# Patient Record
Sex: FEMALE | Employment: FULL TIME | URBAN - METROPOLITAN AREA
[De-identification: names, ages, dates, MRNs, and addresses within clinical notes are randomized per-mention and may not be internally consistent; named-entity substitution may affect disease eponyms.]

---

## 2019-04-16 PROBLEM — N92.0 MENORRHAGIA: Status: ACTIVE | Noted: 2019-04-16

## 2019-12-26 ENCOUNTER — OFFICE VISIT (OUTPATIENT)
Dept: SURGERY | Age: 39
End: 2019-12-26

## 2019-12-26 VITALS
DIASTOLIC BLOOD PRESSURE: 76 MMHG | HEIGHT: 64 IN | OXYGEN SATURATION: 98 % | TEMPERATURE: 98.5 F | RESPIRATION RATE: 18 BRPM | HEART RATE: 80 BPM | WEIGHT: 289.6 LBS | SYSTOLIC BLOOD PRESSURE: 134 MMHG | BODY MASS INDEX: 49.44 KG/M2

## 2019-12-26 DIAGNOSIS — N39.3 SUI (STRESS URINARY INCONTINENCE, FEMALE): ICD-10-CM

## 2019-12-26 DIAGNOSIS — G47.33 OSA (OBSTRUCTIVE SLEEP APNEA): ICD-10-CM

## 2019-12-26 DIAGNOSIS — E66.01 MORBID OBESITY (HCC): ICD-10-CM

## 2019-12-26 DIAGNOSIS — E66.01 MORBID OBESITY (HCC): Primary | ICD-10-CM

## 2019-12-26 RX ORDER — SERTRALINE HYDROCHLORIDE 100 MG/1
150 TABLET, FILM COATED ORAL
COMMUNITY
Start: 2019-05-16

## 2019-12-26 RX ORDER — DOXYCYCLINE 100 MG/1
CAPSULE ORAL
COMMUNITY
Start: 2019-11-03 | End: 2020-03-19 | Stop reason: ALTCHOICE

## 2019-12-26 RX ORDER — TRAVOPROST 0.04 MG/ML
SOLUTION/ DROPS OPHTHALMIC
COMMUNITY
Start: 2019-11-05

## 2019-12-26 NOTE — LETTER
12/26/19 Patient: Rico Hurtado  
YOB: 1980 Date of Visit: 12/26/2019 GRETA Cummins/ Yue Myles [address] [place] [phone] [place] [address] VIA Facsimile: [phone] Dear Lenin Lux DO, Thank you for referring Ms. Rico Hurtado to [facility] for evaluation. My notes for this consultation are attached. If you have questions, please do not hesitate to call me. I look forward to following your patient along with you.  
 
 
Sincerely, 
 
Anthony Castaneda MD

## 2019-12-26 NOTE — PROGRESS NOTES
Initial Consultation for Bariatric Surgery Template (Gastric Bypass)    Shaniqua Souza is a 44 y.o. female who comes into the office today for initial consultation for the surgical options for the treatment of morbid obesity. The patient initially identified obesity at the age of 23 and at age 25 weighed 135 lbs. She has tried a variety of unsupervised weight-loss attempts including self imposed, Weight Watchers, registered dietician and keto, but has yet to meet with lasting success. Maximum weight lost on a diet is about 30 lbs, but that the weight loss always seems to return. She saw me 3.5 ears ago for possible WLS but lost her insurance. Today, the patient is  Height: 5' 4\" (162.6 cm) tall, Weight: 131.4 kg (289 lb 9.6 oz) lbs for a Body mass index is 49.71 kg/m². It is due to the patient's severe obesity, which is further complicated by stress urinary incontinence  that the patient is now seeking out bariatric surgery, specifically, gastric sleeve. Past Medical History:   Diagnosis Date    Acne     Anxiety     Depression     Endometrial polyp     History of heavy vaginal bleeding     Iron deficiency anemia     had iron infusions every other week x4. Last one was 8/4/16    Obesity     Ocular hypertension 2016    Ovarian cyst     Overactive bladder        Past Surgical History:   Procedure Laterality Date    HX DILATION AND CURETTAGE  04/02/2015    D&C; REMOVAL OF ENDOMETRIAL POLYP     HX HYSTEROSCOPY WITH ENDOMETRIAL ABLATION  2014    HX PARTIAL HYSTERECTOMY  04/2019    HX TUBAL LIGATION  2010    tubal ligation        Current Outpatient Medications on File Prior to Visit   Medication Sig Dispense Refill    sertraline (ZOLOFT) 100 mg tablet Take 150 mg by mouth.  TRAVATAN Z 0.004 % ophthalmic solution INT 1 GTT IN OU HS      doxycycline (VIBRAMYCIN) 100 mg capsule TK 1 C PO ONCE DAILY      traZODone (DESYREL) 50 mg tablet Take 25-50 mg by mouth.       oxybutynin chloride XL (DITROPAN XL) 5 mg CR tablet Take 10 mg by mouth daily.  sertraline (ZOLOFT) 100 mg tablet Take  by mouth daily. No current facility-administered medications on file prior to visit. No Known Allergies    Social History     Tobacco Use    Smoking status: Never Smoker    Smokeless tobacco: Never Used   Substance Use Topics    Alcohol use:  Yes     Alcohol/week: 2.0 standard drinks     Types: 2 Glasses of wine per week     Comment: 2 or 3 glasses wine or liquor per weej    Drug use: No       Family History   Problem Relation Age of Onset    Hypertension Mother     Other Mother         mental illness    No Known Problems Father     Blindness Sister     MS Sister     No Known Problems Brother     Heart Attack Maternal Grandmother     Cancer Maternal Grandfather         throat cancer     Cancer Paternal Grandmother         breast cancer, and lung cancer     Dementia Paternal Grandfather     No Known Problems Sister     No Known Problems Brother        Family Status   Relation Name Status    Mother  Alive    Father  Alive    Sister  Alive    Brother  Alive    MGM      MGF      PGM      PGF      Sister  Alive    Brother  Alive       Review of Systems:  Positive in [place]     CONST: Fever, weight loss, fatigue or chills  GI: Nausea, vomiting, abdominal pain, change in bowel habits, hematochezia, melena, and GERD   INTEG: Dermatitis, abnormal moles  HEENT: Recent changes in vision, vertigo, epistaxis, dysphagia and hoarseness  CV: Chest pain, palpitations, HTN, edema and varicosities  RESP: Cough, shortness of breath, wheezing, hemoptysis, snoring and reactive airway disease  : Hematuria, dysuria, frequency, urgency, nocturia and stress urinary incontinence   MS: Weakness, joint pain and arthritis  ENDO: Diabetes, thyroid disease, polyuria, polydipsia, polyphagia, poor wound healing, heat intolerance, cold intolerance  LYMPH/HEME: Anemia, bruising and history of blood transfusions  NEURO: Dizziness, headache, fainting, seizures and stroke  PSYCH: Anxiety and depression    Physical Exam    Visit Vitals  /76   Pulse 80   Temp 98.5 °F (36.9 °C) (Oral)   Resp 18   Ht 5' 4\" (1.626 m)   Wt 131.4 kg (289 lb 9.6 oz)   LMP 04/09/2019   SpO2 98%   BMI 49.71 kg/m²       Pre op weight: 289.6  EBW: 158. 6  Wt loss to date: 0       General: 44 y.o.) female in no acute distress. Morbidly obese in hips and buttocks - gynecoid pattern  HEENT: Normocephalic, atraumatic, Pupils equal and reactive, nasopharynx clear, oropharynx clear and moist without lesions  NECK: Supple, no lymphadenopathy, thyromegaly, carotid bruits or jugular venous distension. trachea midline  RESP: Clear to auscultation bilaterally, no wheezes, rhonchi, or rales, normal respiratory excursion  CV: Regular rate and rhythm, no murmurs, rubs or gallops. 3+/4 pulses in bilateral dorsalis pedis and posterior tibialis. No distal edema or varicosities. ABD: Soft, nontender, nondistended, normoactive bowel sounds, no hernias, no hepatosplenomegaly, easily palpable costal margins, gynecoid distribution, healed lap scars  Extremities: Warm, well perfused, no tenderness or swelling, normal gait/station  Neuro: Sensation and strength grossly intact and symmetrical  Psych: Alert and oriented to person, place, and time. Impression:    Lily Beckham is a 44 y.o. female who is suffering from morbid obesity with a BMI of 50  and comorbidities including stress urinary incontinence  who would benefit from bariatric surgery. We have had an extensive discussion with regard to the risks, benefits and likely outcomes of the operation. We've discussed the restrictive and malabsorptive nature of the gastric bypass and compared and contrasted with the sleeve gastrectomy. The patient understands the likelihood of losing approximately 80% of their excess weight in 12 to 18 months.  She also understands the risks including but not limited to bleeding, infection, need for reoperation, ulcers, leaks and strictures, bowel obstruction secondary to adhesions and internal hernias, DVT, PE, heart attack, stroke, and death. Patient also understands risks of inadequate weight loss, excess weight loss, vitamin insufficiency, protein malnutrition, excess skin, and loss of hair. We have reviewed the components of a successful postoperative course including requirement for a high protein, low carbohydrate diet, 60 oz a day of zero calorie liquids, daily vitamin supplementation, daily exercise, regular follow-up, and participation in support groups. At this time we will enroll the patient in our bariatric program, undertake routine laboratory evaluation, chest X-ray, EKG, possible UGI and evaluation by  nutritionist as well as psychologist and pending their satisfactory completion of the preop evaluation, plan to perform a gastric bypass.

## 2019-12-26 NOTE — PROGRESS NOTES
Chief Complaint   Patient presents with    Morbid Obesity     consult     Pt ID confirmed    Weight Loss Metrics 12/26/2019 12/26/2019 4/16/2019 4/3/2019 12/6/2016 9/13/2016 8/19/2016   Pre op / Initial Wt 289.6 - - - - - -   Today's Wt - 289 lb 9.6 oz 270 lb 1 oz 275 lb 284 lb 287 lb 14.7 oz 290 lb   BMI - 49.71 kg/m2 44.94 kg/m2 45.76 kg/m2 47.26 kg/m2 47.91 kg/m2 48.26 kg/m2   Ideal Body Wt 131 - - - - - -   Excess Body Wt 158.6 - - - - - -   Wt loss to date 0 - - - - - -   % Wt Loss 0 - - - - - -   80% .88 - - - - - -       Body mass index is 49.71 kg/m².

## 2020-01-07 ENCOUNTER — HOSPITAL ENCOUNTER (OUTPATIENT)
Dept: BARIATRICS/WEIGHT MGMT | Age: 40
Discharge: HOME | End: 2020-01-07

## 2020-01-08 ENCOUNTER — DOCUMENTATION ONLY (OUTPATIENT)
Dept: BARIATRICS/WEIGHT MGMT | Age: 40
End: 2020-01-08

## 2020-01-08 NOTE — PROGRESS NOTES
[address] Loss Taiwo Gant [address] [facility], [address]    Patient's Name: Becca Lopez   Age: 36 y.o. YOB: 1980   Sex: female    Date:   1/8/2020        Session: 1 of 6  Revision:     Surgeon:  Dr. Homero Avila    Height: 5 f 4   Weight:    285      Lbs. BMI: 49.0   Pounds Lost since last month: 5                 Pounds Gained since last month: 0    Starting Weight: 290     Previous Months Weight: 290  Overall Pounds Lost: 5   Overall Pounds Gained: 0    Do you smoke? None    Alcohol intake:  Number of drinks at a time:  1  Number of times a week: 3    Class Guidelines    Guidelines are reviewed with patient at the start of every class. 1. Patient understands that weight loss trial classes must be consecutive. Patient understands if they miss a class, it is their responsibility to contact me to reschedule class. I will reach out to patient after their first no show. 2.  Patient understands the expectations that weight maintenance/weight loss is expected during the classes. Failure to demonstrate changes may result in one extra month of weight loss trial, followed by going back to see the surgeon. 3. Patient is also instructed to be doing their labs, blood work, psych visit, support group and any other test that the surgeon has used while they are working on their weight loss trial.    Other Pertinent Information:     Changes Made Since Last Class: Less bread and carbohydrates. Eating Habits and Behaviors      Today we reviewed key diet principles. We talked about snack ideas that would focus more on protein. We also talked about the benefits of filling up on protein first and keeping the daily carbohydrate intake to less than 100 grams per day. Patient was instructed to increase fluid intake to 64 ounces per day and stop all carbonation, caffeine, and sugary drinks.   During class, we talked about the importance of getting on a routine of eating 3 meals a day, eating within one hour of waking up, and not going longer than 4 hours without fueling the body again. I also talked with patient about some meal ideas. Patient's current diet habits include: 3 meals per day. Patient is eating sausage and eggs for breakfast.  Lunch is salad or sandwich. Dinner is seafood, mac and cheese, beef and cabbage. She is snacking on pepperoni, almonds, cheese sticks. She is drinking 60-75 ounces of fluid per day. Physical Activity/Exercise    Comments:     Currently for exercise, patient is not doing anything stating she doesn't have time. We talked about activities for patient to do, including walking, swimming, or chair exercises. Behavior Modification       Comments:   During class, I reviewed a power point with patients called, \"Assessing Your Readiness to Change. \"  During this power point, patient was asked to self-evaluate themselves. At the end, we tallied the scores to determine how ready they are to make changes for the surgery. For the New Year's, I had patient set New Year's resolutions, including a food-related goal, exercise-related goal, and behavior goal.  Patient was encouraged to track the goals on a daily basis using the check off list I provided. Goals should be SMART, specific, measurable, attainable, realistic, and time-orientated. Patient's Goals are:  1. Behavior-Related Goal: No eating after 7 pm.   2. Food-related goal: No bread.   3. Exercise-related goal: 2 x a week for 30-45 minutes of activity      Milka Holguin 87 RD  1/8/2020

## 2020-02-04 ENCOUNTER — HOSPITAL ENCOUNTER (OUTPATIENT)
Dept: BARIATRICS/WEIGHT MGMT | Age: 40
Discharge: HOME | End: 2020-02-04

## 2020-02-05 ENCOUNTER — DOCUMENTATION ONLY (OUTPATIENT)
Dept: BARIATRICS/WEIGHT MGMT | Age: 40
End: 2020-02-05

## 2020-02-05 NOTE — PROGRESS NOTES
[address] [place] BALDOMERO Stalin [address] [facility], [address]    Patient's Name: Adams Michaels   Age: 36 y.o. YOB: 1980   Sex: female    Date:   2/5/2020              Session: 2 of 6  Revision:     Surgeon:  Dr. Florentin Bravo    Height: 5 f 4   Weight:    283      Lbs. BMI: 48.6   Pounds Lost since last month: 2                 Pounds Gained since last month: 0    Starting Weight: 290     Previous Months Weight: 285  Overall Pounds Lost: 7   Overall Pounds Gained: 0    Do you smoke? NOne    Alcohol intake:  Number of drinks at a time:  1  Number of times a week: 2-5 x a week    Class Guidelines    Guidelines are reviewed with patient at the start of every class. 1. Patient understands that weight loss trial classes must be consecutive. Patient understands if they miss a class, it is their responsibility to contact me to reschedule class. I will reach out to patient after their first no show. 2.  Patient understands the expectations that weight maintenance/weight loss is expected during the classes. Failure to demonstrate changes may result in one extra month of weight loss trial, followed by going back to see the surgeon. 3. Patient is also instructed to be doing their labs, blood work, psych visit, support group and any other test that the surgeon has used while they are working on their weight loss trial.   Patient understands that they CAN NOT gain any weight during the weight loss trial.  Gaining weight will result in extra classes    Other Pertinent Information:     Changes Made Since Last Class: Less carbohydrates. Purchase fitbit    Eating Habits and Behaviors      Today we started off class talking about the Key Diet Principles. Patient was encouraged to start drinking 64 ounces of fluid per day. Patient was encouraged to start cutting out soda, caffeine, carbonation, sweet tea, fruit juice, and fruit smoothies.   Patient is currently drinking 70 ounces of fluid, which consists of 50 ounces of water. Patient was also instructed to fill up on meat, fish, vegetables, eggs, cheese, and some fruit. We also talked about protein drinks and patient was encouraged to start trying these, using them either for a meal replacement or a substitute for a current meal, which may be higher in carbohydrates. We talked about ways to lower carbohydrates and start trying substitutes, such as zucchini noodles and cauliflower rice. Patient's current diet habits include: 3 meals per day. She is eating eggs and meats. Lunch is tuna or meatballs. Dinner is chicken, meatballs, salad. She is snacking on cheese, almonds, pecans, or protein drinks. She is drinking 70 ounces of fluid, including 50 ounces of water and 20 ounces of Crystal Light. Physical Activity/Exercise    Comments:     Currently for exercise, patient is not doing anything for activity. We talked about activities for patient to do, including walking, swimming, or chair exercises. I also talked with patient about doing some strength training, which helps the metabolism, as well. Goals have been set. Behavior Modification       Comments:   I also gave a power point on Behavior Changes and Weight Loss. Some of the suggestions in the power point included food journaling. Patient was also given some strategies to follow, such as cooking just enough for the meal and not putting serving bowls on the table. Patient was also encouraged to restrict where they are eating to 1-2 locations to avoid mindless eating throughout the day. Patient was given a check off list and encouraged to set 3 goals for the month. One goal that patient has set for next month is:  1. Go to [employer] once a week. [id number] once a week.       Jazmín Vargas MS RD  2/5/2020

## 2020-03-03 ENCOUNTER — HOSPITAL ENCOUNTER (OUTPATIENT)
Dept: BARIATRICS/WEIGHT MGMT | Age: 40
Discharge: HOME | End: 2020-03-03

## 2020-03-03 ENCOUNTER — DOCUMENTATION ONLY (OUTPATIENT)
Dept: BARIATRICS/WEIGHT MGMT | Age: 40
End: 2020-03-03

## 2020-03-19 ENCOUNTER — OFFICE VISIT (OUTPATIENT)
Dept: SURGERY | Age: 40
End: 2020-03-19

## 2020-03-19 VITALS
WEIGHT: 284 LBS | HEART RATE: 82 BPM | RESPIRATION RATE: 16 BRPM | DIASTOLIC BLOOD PRESSURE: 84 MMHG | OXYGEN SATURATION: 98 % | BODY MASS INDEX: 48.49 KG/M2 | SYSTOLIC BLOOD PRESSURE: 140 MMHG | HEIGHT: 64 IN | TEMPERATURE: 97.5 F

## 2020-03-19 DIAGNOSIS — E66.01 MORBID OBESITY (HCC): Primary | ICD-10-CM

## 2020-03-19 DIAGNOSIS — N39.3 SUI (STRESS URINARY INCONTINENCE, FEMALE): ICD-10-CM

## 2020-03-19 DIAGNOSIS — G47.33 OSA (OBSTRUCTIVE SLEEP APNEA): ICD-10-CM

## 2020-03-19 RX ORDER — FAMOTIDINE 20 MG/1
20 TABLET, FILM COATED ORAL 2 TIMES DAILY
COMMUNITY
End: 2020-12-02 | Stop reason: ALTCHOICE

## 2020-03-19 NOTE — PROGRESS NOTES
Bariatric Preoperative Progress Note    Subjective:     Dax Ambriz is a 36 y.o. female who presents today for followup of their candidacy for bariatric surgery. Since last seen, Dax Ambriz has been working through bariatric program towards Laparoscopic Gastric Bypass. Past Medical History:   Diagnosis Date    Acne     Anxiety     Depression     Endometrial polyp     History of heavy vaginal bleeding     Iron deficiency anemia     had iron infusions every other week x4. Last one was 8/4/16    Obesity     Ocular hypertension 2016    Ovarian cyst     Overactive bladder        Past Surgical History:   Procedure Laterality Date    HX DILATION AND CURETTAGE  04/02/2015    D&C; REMOVAL OF ENDOMETRIAL POLYP     HX HYSTEROSCOPY WITH ENDOMETRIAL ABLATION  2014    HX PARTIAL HYSTERECTOMY  04/2019    HX TUBAL LIGATION  2010    tubal ligation        Current Outpatient Medications   Medication Sig Dispense Refill    famotidine (Pepcid) 20 mg tablet Take 20 mg by mouth two (2) times a day.  sertraline (ZOLOFT) 100 mg tablet Take 150 mg by mouth.  TRAVATAN Z 0.004 % ophthalmic solution INT 1 GTT IN OU HS      oxybutynin chloride XL (DITROPAN XL) 5 mg CR tablet Take 10 mg by mouth daily.  traZODone (DESYREL) 50 mg tablet Take 25-50 mg by mouth.          No Known Allergies    ROS:  Review of Systems:  Positives in [place]  CONST: Fever, weight loss, fatigue-improved or chills  GI: Nausea, vomiting, abdominal pain, change in bowel habits, hematochezia, melena, and GERD   INTEG: Dermatitis, abnormal moles  HEENT: Recent changes in vision, vertigo, epistaxis, dysphagia and hoarseness  CV: Chest pain, palpitations, HTN, edema and varicosities  RESP: Cough, shortness of breath, wheezing, hemoptysis, snoring and reactive airway disease  : Hematuria, dysuria, frequency, urgency, nocturia and stress urinary incontinence   MS: Weakness, joint pain and arthritis  ENDO: Diabetes, thyroid disease, polyuria, polydipsia, polyphagia, poor wound healing, heat intolerance, cold intolerance  LYMPH/HEME: Anemia, bruising and history of blood transfusions  NEURO: Dizziness, headache, fainting, seizures and stroke  PSYCH: Anxiety and depression    Remainder of ROS as per HPI. Objective:     Physical Exam:  Visit Vitals  /84   Pulse 82   Temp 97.5 °F (36.4 °C) (Oral)   Resp 16   Ht 5' 4\" (1.626 m)   Wt 128.8 kg (284 lb)   LMP 04/09/2019   SpO2 98%   BMI 48.75 kg/m²         General: AAOX3, pleasant and cooperative to exam. Appropriately groomed. NAD. Non-toxic in appearance. Appears stated age. HENT: NC/AT. PERRLA. Extraocular motions are intact. Sclera anicteric, Conjunctiva Clear. Nares clear. Oropharynx pink, moist without exudate or erythema. Uvula Midline. Neck:  Supple, trachea is midline. No JVD, Lymphadenopathy. No bruits. Chest: Good equal bilateral expansion  Lungs: Clear to auscultation bilaterally without e/o crackles, wheezes or rhales. Heart: RRR, S1 and S2 noted. No c/r/m/g/vpmi. Abdomen: obese, soft and non-tender without distension. Good bowel sounds. No vis/palp masses or pulsations. No organo-splenomegaly. No hernias to my exam. No e/o acute abdomen or peritoneal signs. Pelvis: Stable. :  Deferred  Rectal: Deferred  Extremities: Positive pulses in all 4 extremities. Baseline range of motion in all 4 extremities. Strength, sensation and reflexes intact, appropriate and equal in b/l upper and lower extremities. No C/C/E  Neuro: CN II-XII grossly intact without focal deficit. Ambulatory. Skin: Clean, warm and dry. Studies to date:    Labs: Pending-not complete    EKG: Pending-not complete     Nutritional evaluation: 3 of 6 complete     Psychiatric evaluation:Approved     Support Group: Has not attended     Additional evaluations:None        Assessment:   Ritesh Grey is a 36 y.o. female who is progressing through the bariatric preoperative evaluation.   At this time, they are not yet an appropriate candidate for weight loss surgery. Ms. Licha Pagan has a reminder for a \"due or due soon\" health maintenance. I have asked that she contact her primary care provider for follow-up on this health maintenance. Plan:   -complete remainder of preop evaluation including nutrition visit, support group attendance, complete labs and ekg.   -Patient voices understanding that weight gain during weight loss trial may result in cancellation of weight loss surgery.   -Follow up once has completed entirety of weight loss workup to determine next steps.     -F/U in two months for MARIA T Mendoza

## 2020-03-21 ENCOUNTER — HOSPITAL ENCOUNTER (OUTPATIENT)
Dept: LAB | Age: 40
Discharge: HOME | End: 2020-03-21
Payer: MEDICAID

## 2020-03-21 ENCOUNTER — HOSPITAL ENCOUNTER (OUTPATIENT)
Dept: LAB | Age: 40
Discharge: HOME | End: 2020-03-21

## 2020-03-21 DIAGNOSIS — N39.3 SUI (STRESS URINARY INCONTINENCE, FEMALE): ICD-10-CM

## 2020-03-21 DIAGNOSIS — E66.01 MORBID OBESITY (HCC): ICD-10-CM

## 2020-03-21 DIAGNOSIS — G47.33 OSA (OBSTRUCTIVE SLEEP APNEA): ICD-10-CM

## 2020-03-21 LAB
25(OH)D3 SERPL-MCNC: 25.8 NG/ML (ref 32–100)
ALBUMIN SERPL-MCNC: 4.2 G/DL (ref 3.5–5)
ANION GAP SERPL CALC-SCNC: 12 MMOL/L
ANISOCYTOSIS: ABNORMAL
ATRIAL RATE: 86 BPM
AVG GLU, 10930: 123 MG/DL (ref 91–123)
BASOPHILS ABS-DIF,2030: 0.1 K/UL (ref 0–0.2)
BASOPHILS C MAN (DIFF), 1068: 1 % (ref 0–2)
BUN SERPL-MCNC: 8 MG/DL (ref 6–22)
CALCIUM SERPL-MCNC: 9.4 MG/DL (ref 8.4–10.5)
CALCULATED P AXIS, ECG09: 11 DEGREES
CALCULATED R AXIS, ECG10: -9 DEGREES
CALCULATED T AXIS, ECG11: -12 DEGREES
CHLORIDE SERPL-SCNC: 103 MMOL/L (ref 98–110)
CO2 SERPL-SCNC: 22 MMOL/L (ref 20–32)
CREAT SERPL-MCNC: 0.5 MG/DL (ref 0.5–1.2)
DIAGNOSIS, 93000: NORMAL
ERYTHROCYTE [DISTWIDTH] IN BLOOD BY AUTOMATED COUNT: 17.1 % (ref 10–15.5)
FOLATE,FOL: 8.64 NG/ML
GFRAA, 66117: >60
GFRNA, 66118: >60
GLUCOSE SERPL-MCNC: 95 MG/DL (ref 70–99)
HBA1C MFR BLD HPLC: 5.9 % (ref 4.8–5.6)
HCT VFR BLD AUTO: 41.4 % (ref 35.1–46.5)
HGB BLD-MCNC: 12.6 G/DL (ref 11.7–15.5)
HYPOCHROMIA, 12007: ABNORMAL
IRON,IRN: 59 MCG/DL (ref 30–160)
LYMPHOCYTES C MAN (DIFF), 1065: 53 % (ref 20–45)
LYMPHS ABS-DIF,2105: 4.3 K/UL (ref 1–4.8)
MCH RBC QN AUTO: 23 PG (ref 26–34)
MCHC RBC AUTO-ENTMCNC: 30 G/DL (ref 31–36)
MCV RBC AUTO: 76 FL (ref 81–99)
MICROCYTES, 12013: ABNORMAL
MONOCYTES ABS-DIF,2141: 0.5 K/UL (ref 0.1–1)
MONOCYTES C MAN (DIFF), 1066: 6 % (ref 3–12)
NEUTROPHILS ABS,2156: 3.3 K/UL (ref 1.8–7.7)
NEUTROPHILS C MAN (DIFF), 1064: 40 % (ref 40–75)
OVALOCYTES, 1119: ABNORMAL
P-R INTERVAL, ECG05: 136 MS
PLATELET # BLD AUTO: 381 K/UL (ref 140–440)
PMV BLD AUTO: 11.5 FL (ref 9–13)
POTASSIUM SERPL-SCNC: 4.6 MMOL/L (ref 3.5–5.5)
Q-T INTERVAL, ECG07: 370 MS
QRS DURATION, ECG06: 86 MS
QTC CALCULATION (BEZET), ECG08: 442 MS
RBC # BLD AUTO: 5.42 M/UL (ref 3.8–5.2)
SENTARA SPECIMEN COL,SENBCF: NORMAL
SMEAR EVAL, 1131: ABNORMAL
SODIUM SERPL-SCNC: 137 MMOL/L (ref 133–145)
TSH SERPL DL<=0.005 MIU/L-ACNC: 0.9 MCU/ML (ref 0.27–4.2)
VENTRICULAR RATE, ECG03: 86 BPM
VIT B12 SERPL-MCNC: 401 PG/ML (ref 211–911)
WBC # BLD AUTO: 8.2 K/UL (ref 4–11)

## 2020-03-21 PROCEDURE — 93005 ELECTROCARDIOGRAM TRACING: CPT

## 2020-03-21 PROCEDURE — 99001 SPECIMEN HANDLING PT-LAB: CPT

## 2020-03-24 LAB — VITAMIN B1, WHOLE BLOOD, 66250: 96.6 NMOL/L (ref 66.5–200)

## 2020-03-25 LAB
H PYLORI IGA SER IA-ACNC: 9.3 UNITS (ref 0–8.9)
H PYLORI IGM SER-ACNC: <9 UNITS (ref 0–8.9)

## 2020-03-31 ENCOUNTER — TELEPHONE (OUTPATIENT)
Dept: SURGERY | Age: 40
End: 2020-03-31

## 2020-03-31 DIAGNOSIS — E55.9 VITAMIN D DEFICIENCY: ICD-10-CM

## 2020-03-31 DIAGNOSIS — A04.8 POSITIVE H. PYLORI TEST: Primary | ICD-10-CM

## 2020-03-31 RX ORDER — CLARITHROMYCIN 500 MG/1
500 TABLET, FILM COATED ORAL 2 TIMES DAILY
Qty: 28 TAB | Refills: 0 | Status: CANCELLED | OUTPATIENT
Start: 2020-03-31 | End: 2020-04-14

## 2020-03-31 RX ORDER — MELATONIN
1000 DAILY
Qty: 30 TAB | Refills: 2 | Status: CANCELLED | OUTPATIENT
Start: 2020-03-31

## 2020-03-31 RX ORDER — AMOXICILLIN 500 MG/1
500 CAPSULE ORAL 3 TIMES DAILY
Qty: 30 CAP | Refills: 0 | Status: CANCELLED | OUTPATIENT
Start: 2020-03-31 | End: 2020-04-10

## 2020-03-31 RX ORDER — OMEPRAZOLE 20 MG/1
20 CAPSULE, DELAYED RELEASE ORAL 2 TIMES DAILY
Qty: 28 CAP | Refills: 0 | Status: CANCELLED | OUTPATIENT
Start: 2020-03-31 | End: 2020-04-14

## 2020-03-31 NOTE — TELEPHONE ENCOUNTER
Spoke to patient to discuss lab values. Patient positive for H. Pylori. Will send treatment to [facility] on file and confirmed by patient. Patient also has a vitamin D. Deficiency, recommend vitamin D3 1,000 international units daily for treatment, medication prescribed to pharmacy.

## 2020-04-01 RX ORDER — OMEPRAZOLE 20 MG/1
20 CAPSULE, DELAYED RELEASE ORAL 2 TIMES DAILY
Qty: 28 CAP | Refills: 0 | Status: SHIPPED | OUTPATIENT
Start: 2020-04-01 | End: 2020-04-15

## 2020-04-01 RX ORDER — MELATONIN
1000 DAILY
Qty: 30 TAB | Refills: 2 | Status: SHIPPED | OUTPATIENT
Start: 2020-04-01 | End: 2020-12-02 | Stop reason: ALTCHOICE

## 2020-04-01 RX ORDER — AMOXICILLIN 500 MG/1
500 CAPSULE ORAL 3 TIMES DAILY
Qty: 30 CAP | Refills: 0 | Status: SHIPPED | OUTPATIENT
Start: 2020-04-01 | End: 2020-04-11

## 2020-04-01 RX ORDER — CLARITHROMYCIN 500 MG/1
500 TABLET, FILM COATED ORAL 2 TIMES DAILY
Qty: 28 TAB | Refills: 0 | Status: SHIPPED | OUTPATIENT
Start: 2020-04-01 | End: 2020-04-15

## 2020-04-07 ENCOUNTER — DOCUMENTATION ONLY (OUTPATIENT)
Dept: BARIATRICS/WEIGHT MGMT | Age: 40
End: 2020-04-07

## 2020-04-07 ENCOUNTER — HOSPITAL ENCOUNTER (OUTPATIENT)
Dept: BARIATRICS/WEIGHT MGMT | Age: 40
Discharge: HOME | End: 2020-04-07

## 2020-04-07 NOTE — PROGRESS NOTES
[address] Loss [place] [address] [address]    Patient's Name: Jenn Rodriguez   Age: 36 y.o. YOB: 1980   Sex: female    Date:   4/7/2020    Insurance:              Session: 4 of 6  Revision:     Surgeon:  Dr. Tl Bingham    Height: 5 f 4   Weight:    281      Lbs. BMI:    Pounds Lost since last month: 6                 Pounds Gained since last month: 0    Starting Weight: 290     Previous Months Weight: 287  Overall Pounds Lost: 9   Overall Pounds Gained: 0    Do you smoke? None    Alcohol intake:  Number of drinks at a time:  1  Number of times a week: 3 x a week    Class Guidelines    Guidelines are reviewed with patient at the start of every class. 1. Patient understands that weight loss trial classes must be consecutive. Patient understands if they miss a class, it is their responsibility to contact me to reschedule class. I will reach out to patient after their first no show. 2.  Patient understands the expectations that weight maintenance/weight loss is expected during the classes. Failure to demonstrate changes may result in one extra month of weight loss trial, followed by going back to see the surgeon. Patient understands that they CAN NOT gain any weight during the weight loss trial.  Gaining weight will result in extra classes. 3. Patient is also instructed to be doing their labs, blood work, psych visit, support group and any other test that the surgeon has used while they are working on their weight loss trial.  4.  Patient was instructed to bring their blue binder to every class and appointment. Other Pertinent Information:     Changes Made Since Last Class: None    Eating Habits and Behaviors      We started off class today by reviewing key diet principles. Patient was given a very specific list of foods that they can eat, which included meat, fish, vegetables, eggs, cheese, fats, soy, and berries. Patient was also given a list of foods that should be avoided. These included sweets (candy, soda, baked goods, ice cream), and starches, including pasta, rice, crackers, chips, oatmeal, bread. We talked about appropriate protein-based snacks, including deli meat, low fat cheese, yogurt, hummus, small handful of almonds. I also gave a power point on ways to help with the metabolism. Some of the food-related ways included: Healthy snacking, eating adequate protein, and getting adequate water. Mild dehydration may slow down one's weight loss. Patient's current diet habits include: Patient states she has been cutting back and has been eating more soups and stuff. She states she is eating 3 meals per day. Breakfast:  8:30-9:00 am:  She states she is eating scrambled eggs with cheese, sausage. Lunch:  She states she does do a protein shake sometimes for lunch. She states sometimes she will do fish or tilapia. She states she may do broccoli or cabbage or burgers without the bun. She states she is drinking 2.5 bottles of water per day. She states she is not really drinking liquid calories, but may have a soda occasionally when she is sick she may have ginger ale. Physical Activity/Exercise    Comments:     Currently for exercise, patient is doing squats. She states she hopes to be able to get out and walk more. We talked about activities for patient to do, including walking, swimming, or chair exercises. I also talked with patient about doing some strength training, which helps the metabolism, as well. Behavior Modification       Comments: In the power point, Mastering Your Metabolism, I also gave behaviors that can help with one's metabolism. These include not skipping meals and being sure to feed and fuel that body rather than going large gaps and putting the body in starvation mode. One goal for next month includes:  1.   Increase her walking       Milka Lazo RD  4/7/2020

## 2020-05-12 ENCOUNTER — HOSPITAL ENCOUNTER (OUTPATIENT)
Dept: BARIATRICS/WEIGHT MGMT | Age: 40
Discharge: HOME | End: 2020-05-12

## 2020-05-12 ENCOUNTER — DOCUMENTATION ONLY (OUTPATIENT)
Dept: BARIATRICS/WEIGHT MGMT | Age: 40
End: 2020-05-12

## 2020-05-12 NOTE — PROGRESS NOTES
[address] Rickie Loss Taiwo Gant [address] [facility], [address]    Patient's Name: Florencio Velez   Age: 36 y.o. YOB: 1980   Sex: female    Date:   5/12/2020    Insurance:              Session: 5 of  6  Surgeon:  Dr. Brina Clayton    Height: 5 f 4   Weight:    283      Lbs. BMI: 48.7   Pounds Lost since last month: 0                 Pounds Gained since last month: 2    Starting Weight: 290     Previous Months Weight: 281  Overall Pounds Lost: 7   Overall Pounds Gained: 0    Do you smoke? None    Alcohol intake:  Number of drinks at a time:  1  Number of times a week: 1    Class Guidelines    Guidelines are reviewed with patient at the start of every class. 1. Patient understands that weight loss trial classes must be consecutive. Patient understands if they miss a class, it is their responsibility to contact me to reschedule class. I will reach out to patient after their first no show. 2.  Patient understands the expectations that weight maintenance/weight loss is expected during the classes. Failure to demonstrate changes may result in one extra month of weight loss trial, followed by going back to see the surgeon. Patient understands that they CAN NOT gain any weight during the weight loss trial.  Gaining weight will result in extra classes. 3. Patient is also instructed to be doing their labs, blood work, psych visit, support group and any other test that the surgeon has used while they are working on their weight loss trial.  4.  Patient was instructed to bring their blue binder to every class and appointment. Other Pertinent Information:     Changes Made Since Last Class: She states she has been taking a detox drink and has started doing the protein shakes. Eating Habits and Behaviors      Today in class we talked about the key diet principles.   We start off each class talking about these principles, which include cutting out liquid calories and focusing on water or other non-calorie, non-carbonated drinks. We also spent time talking about carbohydrates, including foods that have carbohydrates and the goal to keep daily carbohydrates under 100 grams per day. Patient was given ideas of meal and snack choices that are lower in carbohydrates and focus more on protein. Patient was encouraged to start trying protein shakes and was given a list of suggestions. The main topic of class today was: Portion Control. We reviewed in class a power point filled with tips on ways to control portions, including using smaller plates, boxing up portions at a restaurant before starting to eat, and not eating from the container, but rather portioning snacks into smaller bags. Patient's were encouraged to food journal, which helps increase awareness of what and how much they are eating. It was emphasized to patient the importance of reading labels and portion sizes, but also applying these portion sizes. Patient was given a list of items that can help to make portion control easier. For example, a deck of cards or a palm of a hand is a proper portion of meat, a fist is a cup or a proper serving of vegetables. Patient was given 10 tips to help with the portion control. Patient's current diet habits include:  Patient is eating 3 meals per day. Patient states she is snacking on cocoa almonds. She states it is more at 50 ounces of water per day. She states she is not drinking any soda. She states she did have ginger ale when she was sick. She states that her carbohydrate intake has been improving. She states she is trying to get the grilled nuggets from [employer]. She states she is eating out 4 x a week. Physical Activity/Exercise    Comments:     Currently for exercise, patient is walking and doing squats. She states her goal is 30-45 minutes.   Patient was given a list of ideas for activity and was encouraged to incorporate 30 minutes a day into their daily routine. Behavior Modification       Comments: In class, we also focused on the behavior aspects of weight management. This includes being a mindful eater and not eating in front of the TV. Patient is also encouraged to take 20 minutes to eat a meal and eat at a table. One goal for next month includes:  1. Keep carbohydrates under 75 grams or less.       Milka Berger Crow [address] RD  5/12/2020

## 2020-05-14 ENCOUNTER — VIRTUAL VISIT (OUTPATIENT)
Dept: SURGERY | Age: 40
End: 2020-05-14

## 2020-05-14 VITALS — BODY MASS INDEX: 47.97 KG/M2 | HEIGHT: 64 IN | WEIGHT: 281 LBS

## 2020-05-14 DIAGNOSIS — G47.33 OSA (OBSTRUCTIVE SLEEP APNEA): ICD-10-CM

## 2020-05-14 DIAGNOSIS — E55.9 VITAMIN D DEFICIENCY: ICD-10-CM

## 2020-05-14 DIAGNOSIS — E66.01 MORBID OBESITY (HCC): Primary | ICD-10-CM

## 2020-05-14 NOTE — PROGRESS NOTES
Bariatric Preoperative Progress Note      Juan David Kellogg is a 36 y.o. female who was seen by synchronous (real-time) audio-video technology on 5/14/2020. Consent:  She and/or her healthcare decision maker is aware that this patient-initiated Telehealth encounter is a billable service, with coverage as determined by her insurance carrier. She is aware that she may receive a bill and has provided verbal consent to proceed: Yes    I was in the office while conducting this encounter. Location of patient: In car  Names and roles of persons participating in the telehealth services:  Start time:1340  End time: 1355        Subjective:     Juan David Kellogg is a 36 y.o. female who presents today for followup of their candidacy for bariatric surgery. Since last seen, Juan David Kellogg has been working through bariatric program towards LG. Past Medical History:   Diagnosis Date    Acne     Anxiety     Depression     Endometrial polyp     History of heavy vaginal bleeding     Iron deficiency anemia     had iron infusions every other week x4. Last one was 8/4/16    Obesity     Ocular hypertension 2016    Ovarian cyst     Overactive bladder        Past Surgical History:   Procedure Laterality Date    HX DILATION AND CURETTAGE  04/02/2015    D&C; REMOVAL OF ENDOMETRIAL POLYP     HX HYSTEROSCOPY WITH ENDOMETRIAL ABLATION  2014    HX PARTIAL HYSTERECTOMY  04/2019    HX TUBAL LIGATION  2010    tubal ligation        Current Outpatient Medications   Medication Sig Dispense Refill    cholecalciferol (VITAMIN D3) (1000 Units /25 mcg) tablet Take 1 Tab by mouth daily. 30 Tab 2    famotidine (Pepcid) 20 mg tablet Take 20 mg by mouth two (2) times a day.  sertraline (ZOLOFT) 100 mg tablet Take 150 mg by mouth.  TRAVATAN Z 0.004 % ophthalmic solution INT 1 GTT IN OU HS      traZODone (DESYREL) 50 mg tablet Take 25-50 mg by mouth.       oxybutynin chloride XL (DITROPAN XL) 5 mg CR tablet Take 10 mg by mouth daily. No Known Allergies    ROS:  Review of Systems:  Positives in [place]    Constitutional:  Unexpected weight gain/loss, night sweats,fatigue/maliase/lethargy, change in sleep, fever, rash  Eyes:  Visual changes, eye pain, floaters  ENT:  Rhinorrhea, epistaxis, sinus pain, change in hearing, gingival bleeding, sore throat, dysphagia, dysphonia  CV:  Chest pain, shortness of breath, difficulty breathing, orthopnea, palpitations, loss of consciousness, claudication  Resp: Cough, wheeze, haemoptysis, sob, exercise intolerence  GI:  Abdominal pain, dysphagia, reflux, bloating, cramping. Obstipation, haematemesis, brbpr, hematochezia,dark tarry stools. Nausea, vomitting, diarrhea, constipation. : Change in frequency of urination, dysuria, hematuria, change in force of Stream  Neuro: Paresthesias, numbness, weakness, changes in balance, changes in speech, loss of control of bowel or bladder, headaches. Psych:Changes in depression, anxiety, sleep patterns, change in energy Levels  Endocrine: Temperature intolerance, dry skin, hair loss, fatigue,  Episodes of hypoglycemia, changes in libido  Heme: Anemia, pupura, petechia  Skin: Rashes, itchiness, excessive bruising  Musculoskeletal: weakness, arthropathy, lower back pain    Remainder of ROS as per HPI.         Objective:     Vital Signs: (As obtained by patient/caregiver at home)  Visit Vitals   5' 4\" (1.626 m)   Wt 127.5 kg (281 lb)   LMP 04/09/2019   BMI 48.23 kg/m²        Constitutional: [x] Appears well-developed and well-nourished [x] No apparent distress      [] Abnormal -     Mental status: [x] Alert and awake  [x] Oriented to person/place/time [x] Able to follow commands    [] Abnormal -     Eyes:   EOM    [x]  Normal    [] Abnormal -   Sclera  [x]  Normal    [] Abnormal -          Discharge [x]  None visible   [] Abnormal -     HENT: [x] Normocephalic, atraumatic  [] Abnormal -   [x] Mouth/Throat: Mucous membranes are moist    External Ears [x] Normal  [] Abnormal -    Neck: [x] No visualized mass [] Abnormal -     Pulmonary/Chest: [x] Respiratory effort normal   [x] No visualized signs of difficulty breathing or respiratory distress        [] Abnormal -      Musculoskeletal:   [x] Normal gait with no signs of ataxia         [x] Normal range of motion of neck        [] Abnormal -     Neurological:        [x] No Facial Asymmetry (Cranial nerve 7 motor function) (limited exam due to video visit)          [x] No gaze palsy        [] Abnormal -          Skin:        [x] No significant exanthematous lesions or discoloration noted on facial skin         [] Abnormal -            Psychiatric:       [x] Normal Affect [] Abnormal -        [x] No Hallucinations    Other pertinent observable physical exam findings:-      Studies to date:    Labs: significant for H. Pylori (treated) and Vitamin D deficiency (ongoing tx)    EKG:  Normal sinus rhythm   Low voltage QRS   Nonspecific T wave abnormality   Abnormal ECG   No previous ECGs available   Confirmed by Aric Ceron MD, Karen Dang ([id number]) on 3/21/2020 1:09:43 PM     Nutritional evaluation: 5 of 6 complete     Psychiatric evaluation:  Approved     Support Group: Plan to complete next Thursday     Additional evaluations: None         Assessment:   Jossy Ac is a 36 y.o. female who is progressing through the bariatric preoperative evaluation. At this time, they are not yet an appropriate candidate for weight loss surgery. Ms. Clay Gutierrez has a reminder for a \"due or due soon\" health maintenance. I have asked that she contact her primary care provider for follow-up on this health maintenance. Plan:   -complete remainder of preop evaluation including 1 nutrition visit and support group attendance.   -Patient voices understanding that weight gain during weight loss trial may result in cancellation of weight loss surgery.   -Follow up once has completed entirety of weight loss workup to determine next steps. We discussed the expected course, resolution and complications of the diagnosis(es) in detail. Medication risks, benefits, costs, interactions, and alternatives were discussed as indicated. I advised her to contact the office if her condition worsens, changes or fails to improve as anticipated. She expressed understanding with the diagnosis(es) and plan. Pursuant to the emergency declaration under the [address] [place] [address] waiver authority and the [employer] Act, this Virtual  Visit was conducted, with patient's consent, to reduce the patient's risk of exposure to COVID-19 and provide continuity of care for an established patient. Services were provided through a video synchronous discussion virtually to substitute for in-person clinic visit.     Clovis Epley, NP

## 2020-05-14 NOTE — PROGRESS NOTES
Rebecca Liriano is a 36 y.o. female  Chief Complaint   Patient presents with    Morbid Obesity     midtrail     Pt ID confirmed    Weight Loss Metrics 5/14/2020 5/14/2020 3/19/2020 3/19/2020 12/26/2019 12/26/2019 4/16/2019   Pre op / Initial Wt 290 - 284 - 289.6 - -   Today's Wt - 281 lb - 284 lb - 289 lb 9.6 oz 270 lb 1 oz   BMI - 48.23 kg/m2 - 48.75 kg/m2 - 49.71 kg/m2 44.94 kg/m2   Ideal Body Wt 131 - 131 - 131 - -   Excess Body Wt 159 - 153 - 158.6 - -   Goal Wt 163 - 162 - - - -   Wt loss to date 9 - 0 - 0 - -   % Wt Loss 0.07 - 0 - 0 - -   80% .2 - 122.4 - 126.88 - -       Body mass index is 48.23 kg/m².

## 2020-06-09 ENCOUNTER — DOCUMENTATION ONLY (OUTPATIENT)
Dept: BARIATRICS/WEIGHT MGMT | Age: 40
End: 2020-06-09

## 2020-06-09 ENCOUNTER — HOSPITAL ENCOUNTER (OUTPATIENT)
Dept: BARIATRICS/WEIGHT MGMT | Age: 40
Discharge: HOME | End: 2020-06-09

## 2020-06-09 NOTE — PROGRESS NOTES
[address] Rickie Loss Taiwo Gant [address], [address]    Patient's Name: Sree Gleason   Age: 36 y.o. YOB: 1980   Sex: female    Date:   6/9/2020    Insurance:            Session: 6 of 6  Revision:   Surgeon:  Dr. Jostin Hodge    Height: 5 f 4 Weight:    278      Lbs. BMI: 47.8   Pounds Lost since last month: 5               Pounds Gained since last month: 0    Starting Weight: 290   Previous Months Weight: 283  Overall Pounds Lost: 12 Overall Pounds Gained: 0      Do you smoke? None    Alcohol intake:  Number of drinks at a time:  None  Number of times a week: None    Class Guidelines    Guidelines are reviewed with patient at the start of every class. 1. Patient understands that weight loss trial classes must be consecutive. Patient understands if they miss a class, it is their responsibility to contact me to reschedule class. I will reach out to patient after their first no show. 2.  Patient understands the expectations that weight maintenance/weight loss is expected during the classes. Failure to demonstrate changes may result in one extra month of weight loss trial, followed by going back to see the surgeon. Patient understands that they CAN NOT gain any weight during the weight loss trial.  Gaining weight will result in extra classes. 3. Patient is also instructed to be doing their labs, blood work, psych visit, support group and any other test that the surgeon has used while they are working on their weight loss trial.  4.  Patient was instructed to bring their blue binder to every class and appointment. Other Pertinent Information:     Changes Made Since Last Class: Patient has been doing more protein shakes and fluid. Eating Habits and Behaviors    Today in class, I reviewed a power point that discussed Nutrition, Behavior, and Exercise changes to start working on.   Some of the eating behaviors that we discussed included the importance of eating breakfast.  We talked about some of the reasons that people don't eat breakfast and food choices that would be appropriate. We also talked about avoiding liquid calories. Patient is encouraged to aim for 64 ounces of fluid per day and trying to get them from water, Crystal Light, sugar free drinks. We also talked about eating out options that are healthier. Patient was encouraged to always request sauces and dressings on the side and request a salad, broth-based soup, or vegetables in place of fries. Patient's current diet habits include: Patient states she is doing 3 meals and is using that as a snack. Patient states she is drinking more fluid. She is up to 100 ounces. She states she is using a protein shake between meals or cheese cubes or turkey pepperoni. She states she may have cocoa almonds. Physical Activity/Exercise    Comments: We talked about exercise. Patient was given reasons of why exercise is so important and how that can help with their long-term success. I have encouraged patient to get a support system to help with the activity. Currently for activity, patient is doing squats when she is going to the bathroom and trying to walk more. Behavior Modification       Comments: We also talked about behavior modifications. We talked about eating triggers, such as eating in front of the TV and solutions, such as making the TV a no eating zone. If patient is eating out out of emotion, food will only temporarily solve that. Patient is encouraged to HALT and assess if they are eating because they are Hungry, or out of emotions: Anxious, Lonely, Tired, which the food will only temporarily solve. We also talked about ways to prevent relapse. Goals that patient wants to work on includes:  1. Slow down when she eats  2.        Milka Mcmahon Crow [address] RD  6/9/2020

## 2020-07-06 ENCOUNTER — OFFICE VISIT (OUTPATIENT)
Dept: SURGERY | Age: 40
End: 2020-07-06

## 2020-07-06 VITALS
TEMPERATURE: 98.8 F | HEART RATE: 77 BPM | DIASTOLIC BLOOD PRESSURE: 76 MMHG | WEIGHT: 285.4 LBS | OXYGEN SATURATION: 98 % | RESPIRATION RATE: 16 BRPM | HEIGHT: 64 IN | BODY MASS INDEX: 48.73 KG/M2 | SYSTOLIC BLOOD PRESSURE: 128 MMHG

## 2020-07-06 DIAGNOSIS — N39.3 SUI (STRESS URINARY INCONTINENCE, FEMALE): ICD-10-CM

## 2020-07-06 DIAGNOSIS — E66.01 MORBID OBESITY (HCC): ICD-10-CM

## 2020-07-06 DIAGNOSIS — E66.01 MORBID OBESITY (HCC): Primary | ICD-10-CM

## 2020-07-06 DIAGNOSIS — G47.33 OSA (OBSTRUCTIVE SLEEP APNEA): ICD-10-CM

## 2020-07-06 NOTE — H&P (VIEW-ONLY)
Preop History and Physical Exam: 
 
Hernando Burris is a 36 y.o. female who comes into the office today after completing the entirety of the bariatric preoperative protocol satisfactorily. she initially identified obesity at the age of 23 and at age 25 weighed 135lbs. she has tried a variety of unsupervised weight-loss attempts, but has yet to meet with lasting success. Today, the patient is Height: 5' 4\" (162.6 cm) tall, Weight: 129.5 kg (285 lb 6.4 oz) lbs for a Body mass index is 48.99 kg/m². It is due to their severe obesity, which is further complicated by stress urinary incontinence  that the patient is now seeking out bariatric surgery, specifically, the gastric bypass Past Medical History:  
Diagnosis Date  Acne  Anxiety  Depression  Endometrial polyp  History of heavy vaginal bleeding  Iron deficiency anemia   
 had iron infusions every other week x4. Last one was 8/4/16  Obesity  Ocular hypertension 2016  Ovarian cyst   
 Overactive bladder Past Surgical History:  
Procedure Laterality Date  HX DILATION AND CURETTAGE  04/02/2015  
 D&C; REMOVAL OF ENDOMETRIAL POLYP   
 HX HYSTEROSCOPY WITH ENDOMETRIAL ABLATION  2014  HX PARTIAL HYSTERECTOMY  04/2019  HX TUBAL LIGATION  2010  
 tubal ligation Current Outpatient Medications on File Prior to Visit Medication Sig Dispense Refill  cholecalciferol (VITAMIN D3) (1000 Units /25 mcg) tablet Take 1 Tab by mouth daily. 30 Tab 2  
 famotidine (Pepcid) 20 mg tablet Take 20 mg by mouth two (2) times a day.  sertraline (ZOLOFT) 100 mg tablet Take 150 mg by mouth.  TRAVATAN Z 0.004 % ophthalmic solution INT 1 GTT IN OU HS    
 traZODone (DESYREL) 50 mg tablet Take 25-50 mg by mouth.  oxybutynin chloride XL (DITROPAN XL) 5 mg CR tablet Take 10 mg by mouth daily. No current facility-administered medications on file prior to visit. No Known Allergies Social History Tobacco Use  Smoking status: Never Smoker  Smokeless tobacco: Never Used Substance Use Topics  Alcohol use: Yes Alcohol/week: 2.0 standard drinks Types: 2 Glasses of wine per week Comment: 2 or 3 glasses wine or liquor per week  Drug use: No  
 
 
Family History Problem Relation Age of Onset  Hypertension Mother  Other Mother   
     mental illness  No Known Problems Father  Blindness Sister  MS Sister  No Known Problems Brother  Heart Attack Maternal Grandmother  Cancer Maternal Grandfather   
     throat cancer   Cancer Paternal Grandmother   
     breast cancer, and lung cancer   Dementia Paternal Grandfather  No Known Problems Sister  No Known Problems Brother Family Status Relation Name Status  Mother  Alive  Father  Alive  Sister  Alive  Brother  Alive  MGM    MGF    PGM    PGF    Sister  Alive  Brother  Alive Review of Systems:  Positive in [place] 
  
CONST: Fever, weight loss, fatigue or chills GI: Nausea, vomiting, abdominal pain, change in bowel habits, hematochezia, melena, and GERD INTEG: Dermatitis, abnormal moles HEENT: Recent changes in vision, vertigo, epistaxis, dysphagia and hoarseness CV: Chest pain, palpitations, HTN, edema and varicosities RESP: Cough, shortness of breath, wheezing, hemoptysis, snoring and reactive airway disease : Hematuria, dysuria, frequency, urgency, nocturia and stress urinary incontinence MS: Weakness, joint pain and arthritis ENDO: Diabetes, thyroid disease, polyuria, polydipsia, polyphagia, poor wound healing, heat intolerance, cold intolerance LYMPH/HEME: Anemia, bruising and history of blood transfusions NEURO: Dizziness, headache, fainting, seizures and stroke PSYCH: Anxiety and depression Physical Exam 
 
Visit Vitals /76 Pulse 77 Temp 98.8 °F (37.1 °C) (Oral) Resp 16  
 Ht 5' 4\" (1.626 m) Wt 129.5 kg (285 lb 6.4 oz) LMP 04/09/2019 SpO2 98% BMI 48.99 kg/m² General: 44 y.o.) female in no acute distress. Morbidly obese in hips and buttocks - gynecoid pattern HEENT: Normocephalic, atraumatic, Pupils equal and reactive, nasopharynx clear, oropharynx clear and moist without lesions NECK: Supple, no lymphadenopathy, thyromegaly, carotid bruits or jugular venous distension. trachea midline RESP: Clear to auscultation bilaterally, no wheezes, rhonchi, or rales, normal respiratory excursion CV: Regular rate and rhythm, no murmurs, rubs or gallops. 3+/4 pulses in bilateral dorsalis pedis and posterior tibialis. No distal edema or varicosities. ABD: Soft, nontender, nondistended, normoactive bowel sounds, no hernias, no hepatosplenomegaly, easily palpable costal margins, gynecoid distribution, healed lap and pfannenstiel scars Extremities: Warm, well perfused, no tenderness or swelling, normal gait/station Neuro: Sensation and strength grossly intact and symmetrical 
Psych: Alert and oriented to person, place, and time. Studies: LABS: within normal limits except for HP - treated; hypovit D 
EKG: without significant abnormalities NUTRITIONAL EVALUATION has deemed the patient a good candidate for weight loss surgery PSYCHIATRIC EVALUATION has deemed the patient a good candidate for weight loss surgery Impression: 
 
Rajiv Farias is a 36 y.o. female who is suffering from morbid obesity and comorbidities including stress urinary incontinencewho would benefit from bariatric surgery. We've discussed the restrictive and malabsorptive nature of the gastric bypass and compared and contrasted with the sleeve gastrectomy. The patient understands the likelihood of losing approximately 80% of their excess weight in 12 to 18 months.  She also understands the risks including but not limited to bleeding, infection, need for reoperation, anastomotic ulcers, leaks and strictures, bowel obstruction secondary to adhesions and internal hernias, DVT, PE, heart attack, stroke, and death. Patient also understands risks of inadequate weight loss, excess weight loss, vitamin insufficiency, protein malnutrition, excess skin, and loss of hair. We have reviewed the components of a successful postoperative course including requirement for a high protein, low carbohydrate diet, 60 oz a day of zero calorie liquids, daily vitamin supplementation, daily exercise, regular follow-up, and participation in support groups. We have reviewed the preoperative liver shrinking clear liquid diet, as well as reviewed any medication changes required while on the clear liquid diet. In addition, the patient understands that all medications to be taken during the first 8 weeks postoperatively can be taken whole as long as the medication is approximately the size of a Jd 325 mg aspirin tablet in size. The patient further understands that it is his/her responsibility to review these and verify with their primary care doctor and pharmacist that all medications are of the appropriate size. We will schedule the patient for laparoscopic gastric bypass in the near future. The patient was counseled at length about the risks of haley Covid-19 during their perioperative period and any recovery window from their procedure. The patient was made aware that haley Covid-19  may worsen their prognosis for recovering from their procedure and lend to a higher morbidity and/or mortality risk. All material risks, benefits, and reasonable alternatives including postponing the procedure were discussed. The patient does  wish to proceed with the procedure at this time.

## 2020-07-06 NOTE — PROGRESS NOTES
Rajiv Farias is a 36 y.o. female  Chief Complaint   Patient presents with    Pre-op Exam     patient presents today to discuss surgical options. Pt ID confirmed    Weight Loss Metrics 7/6/2020 7/6/2020 5/14/2020 5/14/2020 3/19/2020 3/19/2020 12/26/2019   Pre op / Initial Wt 285.4 - 290 - 284 - 289.6   Today's Wt - 285 lb 6.4 oz - 281 lb - 284 lb -   BMI - 48.99 kg/m2 - 48.23 kg/m2 - 48.75 kg/m2 -   Ideal Body Wt 131 - 131 - 131 - 131   Excess Body Wt 154.4 - 159 - 153 - 158.6   Goal Wt 162 - 163 - 162 - -   Wt loss to date 0 - 9 - 0 - 0   % Wt Loss 0 - 0.07 - 0 - 0   80% .52 - 127.2 - 122.4 - 126.88       Body mass index is 48.99 kg/m².

## 2020-07-06 NOTE — PROGRESS NOTES
Preop History and Physical Exam:    Anne Lopez is a 36 y.o. female who comes into the office today after completing the entirety of the bariatric preoperative protocol satisfactorily. she initially identified obesity at the age of 23 and at age 25 weighed 135lbs. she has tried a variety of unsupervised weight-loss attempts, but has yet to meet with lasting success. Today, the patient is Height: 5' 4\" (162.6 cm) tall, Weight: 129.5 kg (285 lb 6.4 oz) lbs for a Body mass index is 48.99 kg/m². It is due to their severe obesity, which is further complicated by stress urinary incontinence  that the patient is now seeking out bariatric surgery, specifically, the gastric bypass      Past Medical History:   Diagnosis Date    Acne     Anxiety     Depression     Endometrial polyp     History of heavy vaginal bleeding     Iron deficiency anemia     had iron infusions every other week x4. Last one was 8/4/16    Obesity     Ocular hypertension 2016    Ovarian cyst     Overactive bladder        Past Surgical History:   Procedure Laterality Date    HX DILATION AND CURETTAGE  04/02/2015    D&C; REMOVAL OF ENDOMETRIAL POLYP     HX HYSTEROSCOPY WITH ENDOMETRIAL ABLATION  2014    HX PARTIAL HYSTERECTOMY  04/2019    HX TUBAL LIGATION  2010    tubal ligation        Current Outpatient Medications on File Prior to Visit   Medication Sig Dispense Refill    cholecalciferol (VITAMIN D3) (1000 Units /25 mcg) tablet Take 1 Tab by mouth daily. 30 Tab 2    famotidine (Pepcid) 20 mg tablet Take 20 mg by mouth two (2) times a day.  sertraline (ZOLOFT) 100 mg tablet Take 150 mg by mouth.  TRAVATAN Z 0.004 % ophthalmic solution INT 1 GTT IN OU HS      traZODone (DESYREL) 50 mg tablet Take 25-50 mg by mouth.  oxybutynin chloride XL (DITROPAN XL) 5 mg CR tablet Take 10 mg by mouth daily. No current facility-administered medications on file prior to visit.         No Known Allergies    Social History Tobacco Use    Smoking status: Never Smoker    Smokeless tobacco: Never Used   Substance Use Topics    Alcohol use:  Yes     Alcohol/week: 2.0 standard drinks     Types: 2 Glasses of wine per week     Comment: 2 or 3 glasses wine or liquor per week    Drug use: No       Family History   Problem Relation Age of Onset    Hypertension Mother     Other Mother         mental illness    No Known Problems Father     Blindness Sister     MS Sister     No Known Problems Brother     Heart Attack Maternal Grandmother     Cancer Maternal Grandfather         throat cancer     Cancer Paternal Grandmother         breast cancer, and lung cancer     Dementia Paternal Grandfather     No Known Problems Sister     No Known Problems Brother        Family Status   Relation Name Status    Mother  Alive    Father  Alive    Sister  Alive    Brother  Alive    MGM      MGF      PGM      PGF      Sister  Alive    Brother  Alive       Review of Systems:  Positive in BOLD     CONST: Fever, weight loss, fatigue or chills  GI: Nausea, vomiting, abdominal pain, change in bowel habits, hematochezia, melena, and GERD   INTEG: Dermatitis, abnormal moles  HEENT: Recent changes in vision, vertigo, epistaxis, dysphagia and hoarseness  CV: Chest pain, palpitations, HTN, edema and varicosities  RESP: Cough, shortness of breath, wheezing, hemoptysis, snoring and reactive airway disease  : Hematuria, dysuria, frequency, urgency, nocturia and stress urinary incontinence   MS: Weakness, joint pain and arthritis  ENDO: Diabetes, thyroid disease, polyuria, polydipsia, polyphagia, poor wound healing, heat intolerance, cold intolerance  LYMPH/HEME: Anemia, bruising and history of blood transfusions  NEURO: Dizziness, headache, fainting, seizures and stroke  PSYCH: Anxiety and depression    Physical Exam    Visit Vitals  /76   Pulse 77   Temp 98.8 °F (37.1 °C) (Oral)   Resp 16   Ht 5' 4\" (1.626 m)   Wt 129.5 kg (285 lb 6.4 oz)   LMP 04/09/2019   SpO2 98%   BMI 48.99 kg/m²       General: 44 y.o.) female in no acute distress. Morbidly obese in hips and buttocks - gynecoid pattern  HEENT: Normocephalic, atraumatic, Pupils equal and reactive, nasopharynx clear, oropharynx clear and moist without lesions  NECK: Supple, no lymphadenopathy, thyromegaly, carotid bruits or jugular venous distension. trachea midline  RESP: Clear to auscultation bilaterally, no wheezes, rhonchi, or rales, normal respiratory excursion  CV: Regular rate and rhythm, no murmurs, rubs or gallops. 3+/4 pulses in bilateral dorsalis pedis and posterior tibialis. No distal edema or varicosities. ABD: Soft, nontender, nondistended, normoactive bowel sounds, no hernias, no hepatosplenomegaly, easily palpable costal margins, gynecoid distribution, healed lap and pfannenstiel scars  Extremities: Warm, well perfused, no tenderness or swelling, normal gait/station  Neuro: Sensation and strength grossly intact and symmetrical  Psych: Alert and oriented to person, place, and time. Studies:    LABS: within normal limits except for HP - treated; hypovit D  EKG: without significant abnormalities  NUTRITIONAL EVALUATION has deemed the patient a good candidate for weight loss surgery  PSYCHIATRIC EVALUATION has deemed the patient a good candidate for weight loss surgery      Impression:    Rajendra Fay is a 36 y.o. female who is suffering from morbid obesity and comorbidities including stress urinary incontinencewho would benefit from bariatric surgery. We've discussed the restrictive and malabsorptive nature of the gastric bypass and compared and contrasted with the sleeve gastrectomy. The patient understands the likelihood of losing approximately 80% of their excess weight in 12 to 18 months.  She also understands the risks including but not limited to bleeding, infection, need for reoperation, anastomotic ulcers, leaks and strictures, bowel obstruction secondary to adhesions and internal hernias, DVT, PE, heart attack, stroke, and death. Patient also understands risks of inadequate weight loss, excess weight loss, vitamin insufficiency, protein malnutrition, excess skin, and loss of hair. We have reviewed the components of a successful postoperative course including requirement for a high protein, low carbohydrate diet, 60 oz a day of zero calorie liquids, daily vitamin supplementation, daily exercise, regular follow-up, and participation in support groups. We have reviewed the preoperative liver shrinking clear liquid diet, as well as reviewed any medication changes required while on the clear liquid diet. In addition, the patient understands that all medications to be taken during the first 8 weeks postoperatively can be taken whole as long as the medication is approximately the size of a Jd 325 mg aspirin tablet in size. The patient further understands that it is his/her responsibility to review these and verify with their primary care doctor and pharmacist that all medications are of the appropriate size. We will schedule the patient for laparoscopic gastric bypass in the near future. The patient was counseled at length about the risks of haley Covid-19 during their perioperative period and any recovery window from their procedure. The patient was made aware that haley Covid-19  may worsen their prognosis for recovering from their procedure and lend to a higher morbidity and/or mortality risk. All material risks, benefits, and reasonable alternatives including postponing the procedure were discussed. The patient does  wish to proceed with the procedure at this time.

## 2020-07-06 NOTE — PATIENT INSTRUCTIONS
Take the following medications as noted. - If no chelita by the medication, take as prescribed until the midnight prior to surgery. - If the medication is circled, take with a sip of water on the morning of surgery prior to reporting to the hospital  - If the medication has a line drawn through it, do not take that medication after starting your liquid diet before surgery  - If the medication has a star beside it, change its dosing as written beside it on the date written beside it. Current Outpatient Medications   Medication Sig Dispense Refill    cholecalciferol (VITAMIN D3) (1000 Units /25 mcg) tablet Take 1 Tab by mouth daily. 30 Tab 2    famotidine (Pepcid) 20 mg tablet Take 20 mg by mouth two (2) times a day.  sertraline (ZOLOFT) 100 mg tablet Take 150 mg by mouth.  TRAVATAN Z 0.004 % ophthalmic solution INT 1 GTT IN OU HS      traZODone (DESYREL) 50 mg tablet Take 25-50 mg by mouth.  oxybutynin chloride XL (DITROPAN XL) 5 mg CR tablet Take 10 mg by mouth daily.

## 2020-07-13 ENCOUNTER — TELEPHONE (OUTPATIENT)
Dept: MEDSURG UNIT | Age: 40
End: 2020-07-13

## 2020-07-13 RX ORDER — ENOXAPARIN SODIUM 100 MG/ML
40 INJECTION SUBCUTANEOUS DAILY
Qty: 7 SYRINGE | Refills: 0 | Status: SHIPPED | OUTPATIENT
Start: 2020-07-13 | End: 2020-07-20

## 2020-07-14 ENCOUNTER — DOCUMENTATION ONLY (OUTPATIENT)
Dept: MEDSURG UNIT | Age: 40
End: 2020-07-14

## 2020-07-14 ENCOUNTER — DOCUMENTATION ONLY (OUTPATIENT)
Dept: BARIATRICS/WEIGHT MGMT | Age: 40
End: 2020-07-14

## 2020-07-14 NOTE — PROGRESS NOTES
CLINICAL NUTRITION PRE-OPERATIVE EDUCATION    Patient's Name: Jona Cerda   Age: 36 y.o. YOB: 1980   Sex: female    Education & Materials Provided:   - Soft Protein Diet Shopping List  -  Supplemental Resource Guide: MVI, B12, Calcium Citrate, Vitamin D, Vitamin B1,   and iron recommendations  - Protein Supplement Information  - Fluid Requirements/ No Straws  - No Caffeine or Carbonation   - No alcohol              - No Snacks or No Concentrated Sweets     - Exercising   - Support System at [facility] of Support Group meetings. Support System after surgery includes: x     - Key Diet Principles            - Addressed Current Habits/Changes to Make   - Patient has been educated on the liquid diet to begin 1 week prior to surgery. Patient understands the transition of the diet. Attendance of support group: Yes    Summary:  Patient has completed 6 visits with the Registered Dietitian. During these nutrition visits, we focused on dietary changes, behavior changes, and the importance of establishing an exercise routine. The diet protocol that patient was prescribed emphasized on low carbohydrates (less than 100 grams per day prior to surgery and 60-80 grams of protein per day). At today's session, patient was educated on the post-op diet protocol. Patient understands the importance of keeping total fat and sugar less than 3 grams per serving. Patient is aware of the transition of the diet stages and is aware that they will be on clear liquids for 7days, followed by soft protein for 5 weeks. Patient understands the body needs ~ 60-70 grams of protein per day. During the liquid phase, patient will need 60 grams of protein from shakes. Once eating soft protein, patient will only need 1 shake per day. Patient is aware of the importance of the vitamins and protein drinks. We spent a lot of time talking about the vitamins.   Patient understands the importance of being compliant with the diet protocol and the complications and risks that can occur if they are non-compliant with the nutritional protocol and non-compliant with the vitamins. Patient has also been educated on the pre-op liquid diet for 1 week. Patient understands that failure to comply in pre-op liquid diet could result in surgery being canceled. Patient's 6 week post-op nutrition appointment has been scheduled. At this 6 week visit, RD will assess how patient is tolerating soft protein and advance to vegetables, if tolerating soft protein without difficulty. Patient will also see RD again at 9 months post-op. This visit will assess patient's compliance with current protocol, including diet, vitamins, protein shakes, and exercise. Post-op diet guidelines will be reinforced. RD is available for questions and to meet with patient outside of the 6 week and 9 month post-op visit. Ok to proceed.      Candidate for surgery: Yes  Re-evaluation Date:     Milka Murray 87 RD  7/14/2020

## 2020-07-17 NOTE — PROGRESS NOTES
Attended pre op class. Patient was educated on instructions below. Patient was provided a copy and all instructions were understood. Patient  was provided a copy and instructed to call with any questions. Patient was provided phone number to call. Patient verbalized understanding. Reviewed Lovenox  7-Day Preoperative Liquid Diet  Benefits:  ? Reducing intake before surgery will shrink  your liver by depleting glycogen (a form of  stored energy). ? Reduced liver size gives better access to  stomach during surgery, which translates  to a safer surgery. ? Prevents the ?last supper? syndrome. Attended pre op class. Patient was educated on instructions below. Patient was provided a copy and all instructions were understood. Patient  was provided a copy and instructed to call with any questions. Patient was provided phone number to call. Patient verbalized understanding. ? Experiencing weight loss before the  procedure encourages postoperative  compliance and ?jump-starts? weight loss. Specifics:  ? Start seven days before surgery:  ____________________.  ? NO SOLID FOODS!!   Your surgery will be CANCELED if this  diet is not followed!!!  ? Minimum of 64 ounces of fluid daily,  including protein drinks. ? No added sugar or carbonated beverages. ? Continue to take all your prescribed  medication and your vitamin supplements  during this preoperative diet phase. Clear liquids:  ? Water. ? Sugar free, non-carbonated beverages  (crystal light, propel). ? Sugar free popsicles. ? Sugar free Jell-O.  ? Fat free, reduced sodium broth . ? Decaffeinated coffee or decaffeinated tea  with artificial sweeteners. Protein:  ? 60 grams of protein daily  (in liquid supplements). ? Pre-made protein drinks. ? Protein powder added to water. ? 3 gram rule -- limit sugar and fat to less  than 3 grams per 8 ounces.   ? 4 to 6 ounces of low fat/low sugar yogurt  OR cottage cheese three to four times  during the week.  [facility] Gastric Bypass and Sleeve Dietary Progression  Date of Surgery: _ ______________________________________________________________  Ice chips start once admitted on floor. Clear liquid diet.  Begin bariatric clear liquid diet on: _ ______________________________________________   64 ounces of fluid per day. ? Low calorie, low sugar, non-carbonated beverages:  -- Water, Crystal Light, Propel Water, Sugar Free Jell-O, Sugar Free Popsicles, bouillon. -- Start protein supplement during this stage (60 to 70 grams per day). -- Getting your fluid in and staying hydrated is your number one priority! ? The clear liquid diet will last for seven days. Bariatric soft and moist diet.  Begin bariatric soft and moist diet on: _____________________________________________  ? This stage of the diet will last for five weeks, unless otherwise instructed by your surgeon. ? Begin -- one week after surgery. ? End -- six weeks after surgery (or when you follow up with the registered dietitian). ? Soft, moist, high protein foods -- three meals per day plus protein supplements. -- Portions should emphasize on soft protein. -- Portions will be a MAXIMUM of 1 ounce of solid food and 2 to 3 ounces of cottage cheese and yogurt. -- Protein supplements should be between meals and provide 30 to 40 grams per day during  soft protein diet. -- Continue to get 64 ounces of fluid in per day. ? Protein foods that are OK (SLOW TRANSITION) on the soft and moist diet:  -- First week on soft protein should focus on yogurt, cottage cheese, eggs, VEGETARIAN refried  beans, black beans, kidney beans and white beans. (NO BAKED BEANS.)  -- Second through fourth weeks should focus on yogurt, cottage cheese, eggs, canned tuna,  canned chicken, tilapia and fish (needs to be soft enough to be cut up with a fork).   -- Fifth week on soft protein diet should focus on yogurt, cottage cheese, eggs, canned tuna,  canned chicken, tilapia, fish, salmon, chicken breast or turkey. Remember to continue to get 64 ounces of fluid daily on ALL stages. To be advanced to bariatric maintenance stage of the bariatric diet, follow up with the dietitian  six weeks after surgery, around: Things I Need to Know Before Surgery  1. How many ounces of fluid will you need to drink every day after surgery? _________________  2. List three examples of bariatric clear liquids. __________________________________________________________________________  __________________________________________________________________________  __________________________________________________________________________  3. What is the 3 gram rule? ______________________________________________________  __________________________________________________________________________  __________________________________________________________________________  4. What is the 30 minute rule? ____________________________________________________  __________________________________________________________________________  __________________________________________________________________________  5. What are examples of food you will be able to eat on the bariatric soft and moist diet?  __________________________________________________________________________  __________________________________________________________________________  __________________________________________________________________________  6. After surgery I will be able to use a straw. ? TRUE ? FALSE  7. After surgery I will NOT be able to drink carbonated beverages. ? TRUE ? FALSE  -- 26 --  PATIENT GUIDE TO BARIATRIC [address] [facility]  8. After surgery I will be able to drink caffeine. ? TRUE ? FALSE  9.  What four vitamins will you take after surgery?  _____________________________________ _ ___________________________________  _____________________________________ Khadijah Yarbrough ___________________________________  10. How much exercise should you get daily? _________________________________________  __________________________________________________________________________  11. How long should it take you to eat a meal? _ _______________________________________  12. The calcium I have purchased is: ________________________________________________ . This has _________ mg per serving. I will need to take this _________ times a day. 13. The protein drink I have decided on is: ____________________________________________ . This has _________ grams of protein. I will need to drink _________ of my protein drinks  during the full liquid phase and _________ during the soft protein phase. My protein drinks  will give me _________ ounces of fluid. 14. After having a sleeve gastrectomy I will not be able to take NSAIDs  (non-steroidal anti-inflammatory drugs) for _________ weeks. 15. After having a gastric bypass I will not be able to take NSAIDs  (non-steroidal anti-inflammatory drugs) for _____________ weeks. ___________________________________________________    Medications  Please discuss all of your current medications with your surgeon at your preoperative appointment. Your surgeon will inform you regarding which medications to stop before surgery and which  medications you are to take the morning of surgery. Medications to Stop  To minimize the risk of blood loss during surgery,  you must avoid or stop taking medicines that  contain anti-inflammatories, blood thinners, arthritis  medications, and herbal supplements seven to 14 days  before your surgery. A nurse from pre-anesthesia  testing will review your list of medication. Your current  medications will be reviewed at your preoperative  appointment with your surgeon. Note: You may take prescribed narcotics, such as  Vicodin, Ultram and Neurontin.   Diabetic Medications  Adjustments in your diabetic medications will be discussed with your surgeon at your  preoperative appointment. Birth Control  In order to decrease your chance of getting a postoperative blood clot you will be required to stop  any oral contraceptive two weeks before your surgery date. During this time it is your responsibility  to use an effective form of birth control. You will be required to take a pregnancy test the morning  of surgery at the hospital and surgery will be canceled if you are pregnant. We strongly encourage  that you resume your birth control two weeks after surgery or after your first menstrual cycle  following surgery. -- 28 --  PATIENT GUIDE TO BARIATRIC [address] [place] [address] Rd  Non-Steroidal Anti-Inflammatory Drugs (NSAIDs)  Bypass:  One class of medications to avoid after Merlin-en-Y gastric  bypass is NSAIDs. These can cause ulcers or stomach irritation  and are linked to a kind of ulcer called a marginal ulcer after  gastric bypass. Marginal ulcers can bleed or perforate. Usually  they are not fatal, but they can cause months or years of pain,  and are a common cause of re-operation and reversal of gastric  bypass. You will NEVER be able to take NSAIDs again. Your only choice for over the counter pain medication will be  Tylenol (acetaminophen). Steroid use can also be harmful to your stomach but may be necessary in some situations. Please consult with your bariatric surgeon and prescribing physician for approval.  Sleeve gastrectomy:  Following a sleeve gastrectomy you will not be allowed to take NSAIDs unless it has been  discussed and approved by your surgeon. Most commonly taken NSAIDs to be AVOIDED!! 1. Ibuprofen  2. Advil  3. Motrin  4. Excedrin  5. Aspirin  6. Celebrex  7. Naproxen  8. Aleve  9. Voltaren  10. Mobic  The following is a list of NSAIDS that are NEVER to be taken again after gastric bypass surgery:  Ibuprofen which is also known as:  Advil, Advil Childrens, Advil Marcin Strength, Advil Liquigel,  Advil Migraine, Advil Pediatric, Childrens Ibuprofen Berry, Genpril, IBU, Midol IB, Midol Maximum  Strength Cramp Formula, Dolgesic, Motrin Childrens, Motrin IB, Motrin Infant Drops, Motrin Marcin  Strength, Motrin Migraine Pain, Nuprin, Migraine Liqui-gels, Ibu-Tab 200, Cap-Profen, Tab-Profen,  Profen, Ibuprohm, Children?s Elixsure, IB Pro, Vicoprofen, Combunox, A-G Profen, Actiprofen,  Addaprin, Advil Infants Concentrated Drops, Caldolor, [place], Q-Profen, Ibifon 600, Ibren,  Quinones, Midol Cramps & Bodyaches, [place], [place], [place], [place], [place], [facility]. -- 29 --  PATIENT GUIDE TO BARIATRIC [address] [place] [address]  Aspirin which has the brand name:  Arthritis Pain, Aspergum, Aspir-Low, Aspirin  Lite Coat, Jd Aspirin, Bufferin, Easprin,  Ecotrin, Empirin, Fasprin, [place], Halfprin,  [place]raham Aspirin, [place] Aspirin, Excedrin. Ketoprofen which is known as: Orudis KT,  Oruvail, Actron. Sulindac which is sold as: Clinoril. Naproxen is one of the most common NSAIDs,  and is sold as: Aleve, Naprosyn, EC-Naprosyn,  Naprelan, Anaprox, All Day Pain Relief,  Aflaxen, All Day Relief, Anaprox-DS, Comfort  Pac With Naproxen, Aleve Caplet, Aleve Easy  Open Arthritis, Aleve Gelcap, Anaprox-DS,  EC-Naprosyn, Leader Naproxen Sodium, Midol  Extended Relief, Naprelan 375, Naprelan  500, Naprelan 750, Prevacid NapraPac 375,  Prevacid NapraPac, Naprapac, Vimovo. Etodolac is sold under the brand name:  Lodine XL, Lodine. Fenoprofen can be found on the market as:  Nalfon, Nalfon 200. Diclofenac sold as: Arthrotec, Cataflam,  Voltaren, Cambia, Voltaren-XR, Zipsor. Flurbiprofen sold as: Asaid. Ketorolac is sold under the brand name:  Sprix, Toradol, Toradol IM, Toradol IV/IM. Piroxicam is found on the market as: Feldene. Indomethacin known as: Indocin SR, Indocin,  Indo-Harrison, Indomethegan, Indocin IV. Mefenamic Acid sold as: Ponstel.   Meloxicam is sold under the brand name:  Mobic. Nabumetone which is sold as: Relafen. Oxaprozin which has the brand name: Daypro. Ketoprofen which has the brand name:  Actron, Orudis KT, Orudis, Oruvail. Famotidine and Ibuprofen can be found as:  Duexis. Meclofenamate sold as: Meclomen. Tolmetin which can be found on the marked  as: Tolectin, Tolectin 600, Tolectin DS. Salsalate which is sold as: [facility]. Celecoxib which is sold as: Celebrex. -- 30 --  [facility]  Smoking  It is required that ALL patients stop smoking  (including e-cigarettes) and chewing tobacco  three months before surgery. Prior to surgery  your surgeon will order a test to verify that  you have quit. Your surgery will be canceled  if you are smoking. Smoking or chewing tobacco leads to  decreased blood supply to your body?s tissues  and delays healing. Smoking harms every  organ in the body and has been linked to:  ? Blood clots (the leading cause of death after  bariatric surgery). ? Marginal ulcers after gastric bypass. ? Heart disease. ? Stroke. ? Chronic obstructive pulmonary  (lung) disease. ? Increased risk for hip fracture. ? Cataracts. ? Cancer of the mouth, throat, esophagus,  larynx (voice box), stomach, pancreas,  bladder, cervix, and kidney. Pregnancy  It is in your best interest to avoid pregnancy for  12 to 18 months after surgery. Pregnancy during  the rapid weight loss phase can be dangerous  and harmful to both mother and fetus. Do you have an effective method of birth  control? Please consult with your OB/GYN or  PCP for consultation. Alcohol  Alcohol is not recommended after bariatric  surgery. Alcohol contains calories but minimal  nutrition and will work against your weight  loss goal. For example, wine contains twice  the calories per ounce that regular soda does.   The absorption of alcohol changes with gastric  bypass and gastric sleeve because an enzyme  in the stomach which usually begins to digest  alcohol is absent or greatly reduced making  alcohol more potent. Alcohol may also be absorbed more quickly  into the body after gastric bypass or gastric  sleeve. The absorbed alcohol will be more  potent, and studies have demonstrated  that obesity surgery patients reach a higher  alcohol level and maintain the higher levels for  a longer period than others. In some patients  alcohol use can increase and lead to alcohol  dependence or addiction. For all of these  reasons, it is recommended to avoid alcohol  after bariatric surgery. Things to do before surgery:  ? Start the preoperative liquid diet on: _____________________________________________  ? Stop all NSAIDS (see page 30) and aspirin seven days before my surgery: _________________ .  Kym Meadowskes my doctor(PCP or surgeon) regarding stopping Coumadin, Plavix or  other blood thinners. ? Purchase bariatric clear liquids (Crystal Lite, sugar free Jell-O, broth, sugar free popsicles,  protein supplement) and bariatric soft and moist foods (low fat yogurt, cottage cheese, eggs,  tuna, fish, chicken) so that I?m prepared when I get home from the hospital.  ? Purchase all vitamins that will be required following surgery. o Chewable multivitamin -- two per day (ex: Flintstones Complete). o Calcium Citrate -- 1,500 milligrams (500 milligrams, three times a day). o Vitamin B-12 -- 1,000 micrograms daily. o Vitamin D3 -- 5,000 IU daily. ? Create a system to keep track of how many ounces of fluid I am drinking daily  o Postoperative GOAL = minimum of 64 ounces per day. ? Purchase a protein supplement that I like.  o Brand: _ _________________________________________________________________ .  o Ounces: _________________________________________________________________ .  Joseph [place]ade of protein per serving: _________________________________________________ .   Before [address]  ? [place] your teeth -- upon awakening, you may brush your teeth and rinse with water, but do not  swallow the water. ? Take medication -- take only the medications as instructed by your provider with a small sip of  water as soon as you get up. ? Wear proper clothing that is loose-fitting and easily removed. ? Avoid back zippers and pantyhose. ? Please remove ALL jewelry (leave ALL jewelry and valuables at home). ? Avoid using perfumes, deodorant, shaving creams or any scented lotions. ? Bring a case with your name on it to hold your eyeglasses, contact lenses, hearing aids  and dentures. If you do not see your providers clean their hands, please ask them to do so.  ? Family and friends who visit you should not touch the surgical wound or dressings. ? Family and friends should clean their hands with soap and water or an alcohol-based hand  rub before and after visiting you. If you do not see them clean their hands, ask them to clean  their hands. ? Make sure you understand how to care for your incision before you leave the hospital.  ? Always clean your hands before and after caring for your incision. ? Make sure you know who to contact if you have questions or problems after you get home. ? If you have any symptoms of an infection, such as redness and pain at the surgery site, drainage,  or fever, call your doctor immediately. ? Ask your nursing staff to help you out of bed to walk within five hours of arriving at your  hospital room. Getting out of bed to walk helps to decrease complications, such as blood clots  and pneumonia. Length of Stay  You are many types of pain control methods that are available to control discomfort. Effective  pain control will allow you to be up and walking shortly after surgery. Your doctor will choose  the method that is right for you. Regardless of the method of pain medication being used, it is  important for you to communicate with your nurse if the pain medication is not enough.  Call your  nurse for pain medication when the pain is moderate instead of waiting for when pain is severe. What type of pain should I expect? ? Abdominal pain  ? Rib pain  ? Shoulder pain  ? Kimberly Pal feeling in the center of your chest  Nausea:  Nausea following bariatric surgery is very  common. Causes include:  ? Anesthesia  ? Drinking too fastYou will be allowed 1 to 2 ounces of ice chips per hour when you are admitted to the floor. They are solely for your comfort. They are not required. ? You will be expected to walk the night of your surgery. Please ask your nurse or nursing assistant  for help the first time you walk. Please do not walk if you still feel groggy or unsteady on your feet. ? Your pain will be controlled with oral and IV pain medication on the day of surgery. ? In order to prevent pneumonia after surgery you please use your incentive spirometer (ICS)  at least 10 times per hour (see below). ? Pain medication  ? Surgery itself  I understand the serious potential complications include: deep venous thrombosis/pulmonary  embolism (blood clots in the legs and or lungs); gastrointestinal leak (leakage of digestive contents  into the abdomen); sepsis (serious infection); injury to adjacent organs such as esophagus, spleen,  pancreas, liver, diaphragm (requiring intervention or surgical removal); excessive bleeding; bowel  obstruction (blockage of the intestines); or organ failure. I am informed that these complications  can be life threatening. The overall mortality rate (risk of death) from bariatric surgery is close to  0.1 percent (1/1,000), but can be as high as 0.5 percent (1/200) for some patients. Patient initials: ______________  I understand that complications requiring re-operation may occur, either immediately after initial  surgery, or later in the recovery process.   Patient initials: ______________  Other potential complications which I may have include: wound infections or seromas  (fluid collection under skin); hernias (breakdown of tissue holding in abdominal contents);  gastritis or stomach ulcer (inflammation of the stomach); formation of gallstones; formation  of kidney stones or urinary tract infection; or pneumonia. Device related complications such as  foreign body reaction, band slippage, or erosion may occur with the adjustable gastric bandIwill  Pre-op instructions:  1. Shower with the antibacterial soap  the 3 days prior to surgery. 2. Pre-admission testing will contact you 1 week before surgery  3. [facility] will contact you the day before surgery to confirm your surgery time.  Email or call your surgery scheduler if you have not heard from them by 3pm  4. Nothing to eat or drink (NPO) after midnight the night before surgery.  Take any evening medications by 11pm  5. Wear comfortable clothing. 6. Do not bring any valuables. 7. Bring insurance card, prescription card, photo ID and credit card to the hospital.  **Discuss all home medications with your surgeon at you pre-op appointment. Your surgeon will inform you of what medications to stop before surgery and what medications to take the day of surgery. **STOP all NSAIDS (Ibuprofen, Aleve, Advil, Naprosyn etc.) and Aspirin 7 days before your surgery      Important Information:  1. No lifting over 15 lbs. for 6 weeks post-op  2. No driving for 5-03 days after surgery - must be off pain medication. 3. No smoking EVER again! 4. You will NOT be able to take any Non-Steroidal Anti-inflammatories (NSAIDS), Aspirin or Steroids  a. Bypass/revision patients - EVER again. (Tylenol only)  b. Sleeve patients - 6 weeks after surgery  5. Pulse/temperature- twice daily for 2 weeks post-op   6. Avoid pregnancy for 18 months  7. Key Diet principles:  a. Vitamin/mineral supplements-[place] Complete, Calcium citrate, Vitamin D3, Vitamin B12  b. Protein supplements - 60-70 grams/day  c. Minimum 64 oz. fluid per day  d.        What to Expect in the Hospital:  Pre-op area:  o Emergency door take elevator 2nd floor  o Arrive 1.5 hours before surgery  o Lovenox (blood thinner)  o Incentive Spirometer  o SCDs  o Sign consent  o Anesthesia  Start IV    Operating Room:    o Gastric bypass: 2- 2 ½ hours  o Sleeve gastrectomy: 1-1 ½ hours  o Revision: 2-3 hours    PACU(Post Anesthesia Care Unit):  o Nasal cannula  o EKG monitor  o Blood pressure cuff  o Pulse Ox  o Gómez Catheter  o SCDs  o No family allowed  o Minimum one hour  There are many types of pain control methods that are available to control discomfort. Effective  pain control will allow you to be up and walking shortly after surgery. Your doctor will choose  the method that is right for you. Regardless of the method of pain medication being used, it is  important for you to communicate with your nurse if the pain medication is not enough. Call your  nurse for pain medication when the pain is moderate instead of waiting for when pain is severe. What type of pain should I expect? ? Abdominal pain  ? Rib pain  ? Shoulder pain  ? [place] feeling in the center of your chest  Nausea:  Nausea following bariatric surgery is very  common. Causes include:  ? Anesthesia  ? Drinking too fast  ? Pain medication  ? Surgery itself    Expectations on your Day of Surgery  ? You will be allowed 1 to 2 ounces of ice chips per hour when you are admitted to the floor. They are solely for your comfort. They are not required. ? You will be expected to walk the night of your surgery. Please ask your nurse or nursing assistant  for help the first time you walk. Please do not walk if you still feel groggy or unsteady on your feet. ? Your pain will be controlled with oral and IV pain medication on the day of surgery.   ? In order to prevent pneumonia after surgery you please use your incentive spirometer (ICS)  at least 10 times per hour (see below    2 Central (Day of Surgery):  o Private room  o 1-2 oz. ice chips per hour   o IV Dilaudid  or liquid pain medication as needed for pain  o Discontinue gusman catheter  o Walk within 4 hours  o One family member can spend the night (must 18 years or older)  o Cell phone/computers - OK    2 Central (Post-op Day 1/Post-op Day 2):  o 7am - Gastrograffin swallow study (X-ray) for:  o All sleeve gastrectomy patients  o All revision patients   o Discontinue -nasal cannula and heart rate monitor  o Start bariatric clear liquid diet - water, crystal light, broth, Jell-O and protein supplement  o Slowly increase to 3 oz. clear liquids and 1 oz. protein supplement per hour  o Oral pain medication as needed for pain - communicate with your nurse  o Goal:  48 to 64 ounces, clear liquid per day preferable water  o Discharge instructions/Lovenox self-injection instructions   o WALK & WATER    Post-op Goals:  1. Void within 8 hours of your catheter being removed  2. Pain is well controlled with oral pain medication  3. Nausea is under control/No vomiting  4. Tolerating 3 oz. of clear liquids and 1 oz. protein supplement per hour for 3 consecutive hours. Post-op Follow-up Labs will need to be completed 1-2 weeks BEFORE your 3 month, 6 month and yearly visits. These labs are required and your appointment will be rescheduled if they are not completed. My surgical procedure and post-operative hospital stay has been discussed with me and I have been given the opportunity to ask any questions I may have.     Patient Signature: ____________________________  Date: _____________________

## 2020-07-20 ENCOUNTER — OFFICE VISIT (OUTPATIENT)
Dept: ORTHOPEDIC SURGERY | Age: 40
End: 2020-07-20

## 2020-07-20 VITALS
HEART RATE: 84 BPM | DIASTOLIC BLOOD PRESSURE: 65 MMHG | RESPIRATION RATE: 18 BRPM | BODY MASS INDEX: 48.32 KG/M2 | OXYGEN SATURATION: 96 % | SYSTOLIC BLOOD PRESSURE: 138 MMHG | HEIGHT: 64 IN | TEMPERATURE: 98.8 F | WEIGHT: 283 LBS

## 2020-07-20 DIAGNOSIS — M54.59 MECHANICAL LOW BACK PAIN: ICD-10-CM

## 2020-07-20 DIAGNOSIS — M54.9 MID BACK PAIN ON RIGHT SIDE: Primary | ICD-10-CM

## 2020-07-20 DIAGNOSIS — M54.50 RIGHT-SIDED LOW BACK PAIN WITHOUT SCIATICA, UNSPECIFIED CHRONICITY: ICD-10-CM

## 2020-07-20 DIAGNOSIS — M79.18 MYOFASCIAL PAIN: ICD-10-CM

## 2020-07-20 DIAGNOSIS — M54.6 THORACIC SPINE PAIN: ICD-10-CM

## 2020-07-20 DIAGNOSIS — M54.50 LUMBAR SPINE PAIN: ICD-10-CM

## 2020-07-20 DIAGNOSIS — R10.9 RIGHT FLANK PAIN: ICD-10-CM

## 2020-07-20 NOTE — PROGRESS NOTES
[address]  [address], [address]  [place] [address] [address]  Phone: [phone]  Fax: [phone]        Miguel Clarke  : 1980  PCP: Gwenevere Ahumada,   2020    NEW PATIENT      HISTORY OF PRESENT ILLNESS  Ivan Ram is a 36 y.o. female c/o right-sided thoracolumbar pain that she localizes more towards her right flank that wrapped around beneath her right breast. Her pain was progressive since 2020, but peaked around May 5. It has improved on its own some. She has tried oral steroids, Naproxen, and a muscle relaxant. She has lost 11 lbs through diet, but is scheduled to have bariatric surgery on 2020. Pain Score: 4/10. Treatments patient has tried:  Physical therapy:No  Doing HEP: Unknown  Non-opioid medications: Yes  Spinal injections: No  Spinal surgery- No.   Last Spine MRI: None    PmHx: hysterectomy, sleep apnea    ASSESSMENT  This is a 36year-old female with history of right flank pain that previously wrapped around to underneath her right breast without a noticeable rash. Her pain has improved some on its own. She had a mildly positive Lofton's sign on the R. Movements that stretch the flank region and palpation exacerbate the pain. Her pain is likely mechanical in nature. PLAN  1. [facility] - Pt will likely need to delay start of PT by 1 month due to bariatric surgery    Pt will f/u in 3 months or sooner if needed. Diagnoses and all orders for this visit:    1. Mid back pain on right side  -     AMB POC XRAY, SPINE; THORACIC, 2 VIEW  -     REFERRAL TO PHYSICAL THERAPY    2. Right-sided low back pain without sciatica, unspecified chronicity  -     AMB POC XRAY, SPINE, LUMBOSACRAL; 2 O  -     REFERRAL TO PHYSICAL THERAPY    3. Right flank pain  -     REFERRAL TO PHYSICAL THERAPY    4. Thoracic spine pain  -     REFERRAL TO PHYSICAL THERAPY    5.  Lumbar spine pain  -     REFERRAL TO PHYSICAL THERAPY    6. Mechanical low back pain  -     REFERRAL TO PHYSICAL THERAPY    7. Myofascial pain  -     REFERRAL TO PHYSICAL THERAPY         CHIEF COMPLAINT  Blake Huitron is seen today in consultation at the request of Lj Mahoney DO for complaints of right flank pain. PAST MEDICAL HISTORY   Past Medical History:   Diagnosis Date    Acne     Anxiety     Depression     Endometrial polyp     History of heavy vaginal bleeding     Iron deficiency anemia     had iron infusions every other week x4. Last one was 8/4/16    Obesity     Ocular hypertension 2016    Ovarian cyst     Overactive bladder        Past Surgical History:   Procedure Laterality Date    HX DILATION AND CURETTAGE  04/02/2015    D&C; REMOVAL OF ENDOMETRIAL POLYP     HX HYSTEROSCOPY WITH ENDOMETRIAL ABLATION  2014    HX PARTIAL HYSTERECTOMY  04/2019    HX TUBAL LIGATION  2010    tubal ligation        MEDICATIONS    Current Outpatient Medications   Medication Sig Dispense Refill    enoxaparin (Lovenox) 40 mg/0.4 mL 0.4 mL by SubCUTAneous route daily for 7 days. Surgery 2/26 7 Syringe 0    cholecalciferol (VITAMIN D3) (1000 Units /25 mcg) tablet Take 1 Tab by mouth daily. 30 Tab 2    famotidine (Pepcid) 20 mg tablet Take 20 mg by mouth two (2) times a day.  sertraline (ZOLOFT) 100 mg tablet Take 150 mg by mouth.  TRAVATAN Z 0.004 % ophthalmic solution INT 1 GTT IN OU HS      traZODone (DESYREL) 50 mg tablet Take 25-50 mg by mouth.  oxybutynin chloride XL (DITROPAN XL) 5 mg CR tablet Take 10 mg by mouth daily.          ALLERGIES  No Known Allergies       SOCIAL HISTORY    Social History     Socioeconomic History    Marital status: UNKNOWN     Spouse name: Not on file    Number of children: Not on file    Years of education: Not on file    Highest education level: Not on file   Tobacco Use    Smoking status: Never Smoker    Smokeless tobacco: Never Used   Substance and Sexual Activity    Alcohol use: Yes     Alcohol/week: 2.0 standard drinks     Types: 2 Glasses of wine per week     Comment: 2 or 3 glasses wine or liquor per week    Drug use: No       FAMILY HISTORY  Family History   Problem Relation Age of Onset    Hypertension Mother     Other Mother         mental illness    No Known Problems Father     Blindness Sister     MS Sister     No Known Problems Brother     Heart Attack Maternal Grandmother     Cancer Maternal Grandfather         throat cancer 1999    Cancer Paternal Grandmother         breast cancer, and lung cancer 2011    Dementia Paternal Grandfather     No Known Problems Sister     No Known Problems Brother          REVIEW OF SYSTEMS  Review of Systems   Genitourinary: Positive for flank pain ( right). PHYSICAL EXAMINATION  Visit Vitals  /65 (BP 1 Location: Left arm, BP Patient Position: Sitting)   Pulse 84   Temp 98.8 °F (37.1 °C) (Oral)   Resp 18   Ht 5' 4\" (1.626 m)   Wt 283 lb (128.4 kg)   LMP 04/09/2019   SpO2 96%   BMI 48.58 kg/m²         Pain Assessment  7/20/2020   Location of Pain Back   Severity of Pain 4   Quality of Pain Dull; Sharp   Duration of Pain Persistent   Frequency of Pain Constant   Aggravating Factors Standing;Walking   Limiting Behavior Some   Relieving Factors Heat   Result of Injury No         Constitutional:  Well developed, well nourished, in no acute distress. Psychiatric: Affect and mood are appropriate. HEENT: Normocephalic, atraumatic. Extraocular movements intact. Integumentary: No rashes or abrasions noted on exposed areas. Cardiovascular: Regular rate and rhythm. Pulmonary: Clear to auscultation bilaterally. SPINE/MUSCULOSKELETAL EXAM    Cervical spine:  Neck is midline. Normal muscle tone. No focal atrophy is noted. ROM pain free. Shoulder ROM intact. No tenderness to palpation. Negative Spurling's sign. Negative Tinel's sign.    Mildly positive Lofton's sign on the R.               Sensation in the bilateral arms grossly intact to light touch. Thoracic spine:  Tenderness to palpation of right flank. Pain with side bend to the L. Lumbar spine:  No rash, ecchymosis, or gross obliquity. No fasciculations. No focal atrophy is noted. No pain with hip ROM. Full range of motion. No tenderness to palpation. No tenderness to palpation at the sciatic notch. SI joints non-tender. Trochanters non tender. Sensation in the bilateral legs grossly intact to light touch. MOTOR:      Biceps  Triceps Deltoids Wrist Ext Wrist Flex Hand Intrin   Right 5/5 5/5 5/5 5/5 5/5 5/5   Left 5/5 5/5 5/5 5/5 5/5 5/5             Hip Flex  Quads Hamstrings Ankle DF EHL Ankle PF   Right 5/5 5/5 5/5 5/5 5/5 5/5   Left 5/5 5/5 5/5 5/5 5/5 5/5     DTRs are 2+ biceps, triceps, brachioradialis, patella, and Achilles. Negative Straight Leg raise. Squat not tested. No difficulty with tandem gait. Ambulation without assistive device. FWB. RADIOGRAPHS/DATA  2V Lumbar XR images taken on 7/20/2020 personally reviewed with patient:  Normal alignment. Normal disc spacing. No obvious compression fractures or instabilities. 2V Thoracic XR images taken on 7/20/2020 personally reviewed with patient:  Normal alignment. Normal disc spacing. No obvious compression fractures or instabilities. CT ABD/PELVIS images taken on 5/5/2020 personally reviewed with patient:  LOWER CHEST:     Minimal reticular basilar partial atelectasis or scarring. No basilar pleural effusion. Top normal heart size    SOLID UPPER ABDOMINAL ORGANS:    LIVER, BILIARY:  macro small fatty  macro incidental liver Unremarkable liver. No biliary ductal dilatation. Grossly unremarkable gallbladder. PANCREAS:  Unremarkable. SPLEEN:  Unremarkable. ADRENALS:  Right: Unremarkable. Left: Unremarkable.     KIDNEYS/URETERS/BLADDER:  There are several small < 1 cm and /or punctate hypodensities in both kidneys, too small to characterize accurately by density criteria, statistically likely benign and probably cysts. Right: Unremarkable. Left: Round 1 cm intracortical interpolar cyst. Otherwise unremarkable     No upper urinary tract dilatation. RETROPERITONEUM:    LYMPH NODES:  No evident abdominopelvic retroperitoneal lymphadenopathy  or discrete retroperitoneal mass. GASTROINTESTINAL TRACT:    No perihepatic ascites. No evident pneumoperitoneum. Herbert Rein Abdominal /pelvic wall is unremarkable. Gastrointestinal tract is less than optimally evaluated on this exam, as unprepped, not well distended, and without oral contrast.  Minimal variant course extension of the splenic flexure into the left paracrural medial subphrenic space. Otherwise GI Tract appears grossly unremarkable, including appendix. VASCULATURE:    Abdominal aorta is of normal caliber. No aneurysm or periaortic hematoma. PELVIC ORGANS:    Urinary bladder appears reasonably distended,  is within normal limits, for degree of distension. Prior hysterectomy. Probable 2 cm cyst/dominant follicle right ovary. Slightly anteriorly positioned left ovary. No free pelvic fluid    MISC:     BONES:    No definite destructive/aggressive lesions, or evident acute fracture. Minimal lower thoracic and L5-S1 lumbar spondylosis. Multilevel pelvic and bilateral trochanteric enthesopathy. L4-5 disc bulge. Large body habitus. IMPRESSION    1. Prior hysterectomy. 2 cm cyst or dominant follicle right ovary. 2. No CT evidence of acute appendicitis or right-sided colitis. 3. Small left renal cyst. Other minor and/or ancillary findings as above       reviewed    Ms. Zaina Shanks has a reminder for a \"due or due soon\" health maintenance. I have asked that she contact her primary care provider for follow-up on this health maintenance.      22 minutes of face-to-face contact were spent with the patient during today's visit extensively discussing symptoms and treatment plan.  All questions were answered. More than half of this visit today was spent on counseling. Written by Vidhi Leonard, as dictated by Dr. Nette Avila. I, Dr. Nette Avila, confirm that all documentation is accurate.

## 2020-07-24 ENCOUNTER — HOSPITAL ENCOUNTER (OUTPATIENT)
Dept: PREADMISSION TESTING | Age: 40
Discharge: HOME | End: 2020-07-24
Payer: MEDICAID

## 2020-07-24 LAB
ANION GAP SERPL CALC-SCNC: 7 MMOL/L (ref 3–18)
BUN SERPL-MCNC: 14 MG/DL (ref 7–18)
BUN/CREAT SERPL: 18 (ref 12–20)
CALCIUM SERPL-MCNC: 9.2 MG/DL (ref 8.5–10.1)
CHLORIDE SERPL-SCNC: 105 MMOL/L (ref 100–111)
CO2 SERPL-SCNC: 26 MMOL/L (ref 21–32)
CREAT SERPL-MCNC: 0.78 MG/DL (ref 0.6–1.3)
GLUCOSE SERPL-MCNC: 83 MG/DL (ref 74–99)
POTASSIUM SERPL-SCNC: 3.9 MMOL/L (ref 3.5–5.5)
SODIUM SERPL-SCNC: 138 MMOL/L (ref 136–145)

## 2020-07-24 PROCEDURE — 87635 SARS-COV-2 COVID-19 AMP PRB: CPT

## 2020-07-24 PROCEDURE — 36415 COLL VENOUS BLD VENIPUNCTURE: CPT

## 2020-07-24 PROCEDURE — 80048 BASIC METABOLIC PNL TOTAL CA: CPT

## 2020-07-25 LAB — SARS-COV-2, COV2NT: NOT DETECTED

## 2020-07-28 ENCOUNTER — ANESTHESIA EVENT (OUTPATIENT)
Dept: SURGERY | Age: 40
DRG: 403 | End: 2020-07-28
Payer: MEDICAID

## 2020-07-29 ENCOUNTER — HOSPITAL ENCOUNTER (INPATIENT)
Age: 40
LOS: 1 days | Discharge: HOME | DRG: 403 | End: 2020-07-30
Attending: SURGERY | Admitting: SURGERY
Payer: MEDICAID

## 2020-07-29 ENCOUNTER — TELEPHONE (OUTPATIENT)
Dept: MEDSURG UNIT | Age: 40
End: 2020-07-29

## 2020-07-29 ENCOUNTER — ANESTHESIA (OUTPATIENT)
Dept: SURGERY | Age: 40
DRG: 403 | End: 2020-07-29
Payer: MEDICAID

## 2020-07-29 DIAGNOSIS — E66.01 MORBID OBESITY (HCC): ICD-10-CM

## 2020-07-29 DIAGNOSIS — Z98.84 STATUS POST GASTRIC BYPASS FOR OBESITY: Primary | ICD-10-CM

## 2020-07-29 PROCEDURE — 77030040830 HC CATH URETH FOL MDII -A: Performed by: SURGERY

## 2020-07-29 PROCEDURE — 77030019605: Performed by: SURGERY

## 2020-07-29 PROCEDURE — 77030027876 HC STPLR ENDOSC FLX PWR J&J -G1: Performed by: SURGERY

## 2020-07-29 PROCEDURE — 77030008683 HC TU ET CUF COVD -A: Performed by: ANESTHESIOLOGY

## 2020-07-29 PROCEDURE — C9290 INJ, BUPIVACAINE LIPOSOME: HCPCS | Performed by: SURGERY

## 2020-07-29 PROCEDURE — 74011250636 HC RX REV CODE- 250/636: Performed by: NURSE ANESTHETIST, CERTIFIED REGISTERED

## 2020-07-29 PROCEDURE — 0D164ZA BYPASS STOMACH TO JEJUNUM, PERCUTANEOUS ENDOSCOPIC APPROACH: ICD-10-PCS | Performed by: SURGERY

## 2020-07-29 PROCEDURE — 76010000131 HC OR TIME 2 TO 2.5 HR: Performed by: SURGERY

## 2020-07-29 PROCEDURE — 77030034154 HC SHR COAG HARM ACE J&J -F: Performed by: SURGERY

## 2020-07-29 PROCEDURE — 74011250636 HC RX REV CODE- 250/636

## 2020-07-29 PROCEDURE — 77030039266 HC ADH SKN EXOFIN S2SG -A: Performed by: SURGERY

## 2020-07-29 PROCEDURE — 77030009968 HC RELD STPLR ENDOSC J&J -D: Performed by: SURGERY

## 2020-07-29 PROCEDURE — 74011250636 HC RX REV CODE- 250/636: Performed by: SURGERY

## 2020-07-29 PROCEDURE — 77030018842 HC SOL IRR SOD CL 9% BAXT -A: Performed by: SURGERY

## 2020-07-29 PROCEDURE — 74011000272 HC RX REV CODE- 272: Performed by: SURGERY

## 2020-07-29 PROCEDURE — 77030008598 HC TRCR ENDOSC BLDLS J&J -B: Performed by: SURGERY

## 2020-07-29 PROCEDURE — 77030008437 HC REINF STRP REINF SEMGD WLGO -C: Performed by: SURGERY

## 2020-07-29 PROCEDURE — 77030008602 HC TRCR ENDOSC EPATH J&J -B: Performed by: SURGERY

## 2020-07-29 PROCEDURE — 77030008756 HC TU IRR SUC STRY -B: Performed by: SURGERY

## 2020-07-29 PROCEDURE — 77030010031 HC SCIS ENDOSC MPLR J&J -C: Performed by: SURGERY

## 2020-07-29 PROCEDURE — 77030002996 HC SUT SLK J&J -A: Performed by: SURGERY

## 2020-07-29 PROCEDURE — 74011250636 HC RX REV CODE- 250/636: Performed by: ANESTHESIOLOGY

## 2020-07-29 PROCEDURE — 74011250637 HC RX REV CODE- 250/637: Performed by: SURGERY

## 2020-07-29 PROCEDURE — 77030016151 HC PROTCTR LNS DFOG COVD -B: Performed by: SURGERY

## 2020-07-29 PROCEDURE — 74011000250 HC RX REV CODE- 250: Performed by: NURSE ANESTHETIST, CERTIFIED REGISTERED

## 2020-07-29 PROCEDURE — 77030027138 HC INCENT SPIROMETER -A

## 2020-07-29 PROCEDURE — 77030036732 HC RELD STPLR VASC J&J -F: Performed by: SURGERY

## 2020-07-29 PROCEDURE — 77030008603 HC TRCR ENDOSC EPATH J&J -C: Performed by: SURGERY

## 2020-07-29 PROCEDURE — 77030040922 HC BLNKT HYPOTHRM STRY -A: Performed by: SURGERY

## 2020-07-29 PROCEDURE — 77030013079 HC BLNKT BAIR HGGR 3M -A: Performed by: ANESTHESIOLOGY

## 2020-07-29 PROCEDURE — 74011000250 HC RX REV CODE- 250

## 2020-07-29 PROCEDURE — 77030002933 HC SUT MCRYL J&J -A: Performed by: SURGERY

## 2020-07-29 PROCEDURE — 76210000016 HC OR PH I REC 1 TO 1.5 HR: Performed by: SURGERY

## 2020-07-29 PROCEDURE — 76060000035 HC ANESTHESIA 2 TO 2.5 HR: Performed by: SURGERY

## 2020-07-29 PROCEDURE — 65270000029 HC RM PRIVATE

## 2020-07-29 PROCEDURE — 77030031139 HC SUT VCRL2 J&J -A: Performed by: SURGERY

## 2020-07-29 PROCEDURE — 77030040361 HC SLV COMPR DVT MDII -B: Performed by: SURGERY

## 2020-07-29 RX ORDER — DIPHENHYDRAMINE HYDROCHLORIDE 50 MG/ML
25 INJECTION, SOLUTION INTRAMUSCULAR; INTRAVENOUS
Status: DISCONTINUED | OUTPATIENT
Start: 2020-07-29 | End: 2020-07-30 | Stop reason: HOSPADM

## 2020-07-29 RX ORDER — LIDOCAINE HYDROCHLORIDE 10 MG/ML
0.1 INJECTION, SOLUTION EPIDURAL; INFILTRATION; INTRACAUDAL; PERINEURAL AS NEEDED
Status: DISCONTINUED | OUTPATIENT
Start: 2020-07-29 | End: 2020-07-29 | Stop reason: HOSPADM

## 2020-07-29 RX ORDER — HYOSCYAMINE SULFATE 0.12 MG/1
0.12 TABLET SUBLINGUAL
Status: DISCONTINUED | OUTPATIENT
Start: 2020-07-29 | End: 2020-07-30 | Stop reason: HOSPADM

## 2020-07-29 RX ORDER — ONDANSETRON 2 MG/ML
INJECTION INTRAMUSCULAR; INTRAVENOUS AS NEEDED
Status: DISCONTINUED | OUTPATIENT
Start: 2020-07-29 | End: 2020-07-29 | Stop reason: HOSPADM

## 2020-07-29 RX ORDER — NALOXONE HYDROCHLORIDE 0.4 MG/ML
0.4 INJECTION, SOLUTION INTRAMUSCULAR; INTRAVENOUS; SUBCUTANEOUS AS NEEDED
Status: DISCONTINUED | OUTPATIENT
Start: 2020-07-29 | End: 2020-07-30 | Stop reason: HOSPADM

## 2020-07-29 RX ORDER — LABETALOL HCL 20 MG/4 ML
10 SYRINGE (ML) INTRAVENOUS
Status: COMPLETED | OUTPATIENT
Start: 2020-07-29 | End: 2020-07-29

## 2020-07-29 RX ORDER — NEOSTIGMINE METHYLSULFATE 1 MG/ML
INJECTION, SOLUTION INTRAVENOUS AS NEEDED
Status: DISCONTINUED | OUTPATIENT
Start: 2020-07-29 | End: 2020-07-29 | Stop reason: HOSPADM

## 2020-07-29 RX ORDER — OXYCODONE HYDROCHLORIDE 5 MG/1
5 TABLET ORAL
Status: DISCONTINUED | OUTPATIENT
Start: 2020-07-29 | End: 2020-07-30 | Stop reason: HOSPADM

## 2020-07-29 RX ORDER — MIDAZOLAM HYDROCHLORIDE 1 MG/ML
INJECTION, SOLUTION INTRAMUSCULAR; INTRAVENOUS AS NEEDED
Status: DISCONTINUED | OUTPATIENT
Start: 2020-07-29 | End: 2020-07-29 | Stop reason: HOSPADM

## 2020-07-29 RX ORDER — ONDANSETRON 2 MG/ML
4 INJECTION INTRAMUSCULAR; INTRAVENOUS ONCE
Status: COMPLETED | OUTPATIENT
Start: 2020-07-29 | End: 2020-07-29

## 2020-07-29 RX ORDER — ONDANSETRON 2 MG/ML
4 INJECTION INTRAMUSCULAR; INTRAVENOUS
Status: DISCONTINUED | OUTPATIENT
Start: 2020-07-29 | End: 2020-07-30 | Stop reason: HOSPADM

## 2020-07-29 RX ORDER — ENOXAPARIN SODIUM 100 MG/ML
40 INJECTION SUBCUTANEOUS ONCE
Status: COMPLETED | OUTPATIENT
Start: 2020-07-29 | End: 2020-07-29

## 2020-07-29 RX ORDER — HYDROMORPHONE HYDROCHLORIDE 2 MG/ML
INJECTION, SOLUTION INTRAMUSCULAR; INTRAVENOUS; SUBCUTANEOUS AS NEEDED
Status: DISCONTINUED | OUTPATIENT
Start: 2020-07-29 | End: 2020-07-29 | Stop reason: HOSPADM

## 2020-07-29 RX ORDER — KETOROLAC TROMETHAMINE 15 MG/ML
15 INJECTION, SOLUTION INTRAMUSCULAR; INTRAVENOUS EVERY 6 HOURS
Status: COMPLETED | OUTPATIENT
Start: 2020-07-29 | End: 2020-07-30

## 2020-07-29 RX ORDER — FENTANYL CITRATE 50 UG/ML
INJECTION, SOLUTION INTRAMUSCULAR; INTRAVENOUS AS NEEDED
Status: DISCONTINUED | OUTPATIENT
Start: 2020-07-29 | End: 2020-07-29 | Stop reason: HOSPADM

## 2020-07-29 RX ORDER — ENOXAPARIN SODIUM 100 MG/ML
40 INJECTION SUBCUTANEOUS DAILY
Status: DISCONTINUED | OUTPATIENT
Start: 2020-07-30 | End: 2020-07-30 | Stop reason: HOSPADM

## 2020-07-29 RX ORDER — HYDROMORPHONE HYDROCHLORIDE 1 MG/ML
1 INJECTION, SOLUTION INTRAMUSCULAR; INTRAVENOUS; SUBCUTANEOUS
Status: DISCONTINUED | OUTPATIENT
Start: 2020-07-29 | End: 2020-07-30 | Stop reason: HOSPADM

## 2020-07-29 RX ORDER — LIDOCAINE HYDROCHLORIDE 20 MG/ML
INJECTION, SOLUTION EPIDURAL; INFILTRATION; INTRACAUDAL; PERINEURAL AS NEEDED
Status: DISCONTINUED | OUTPATIENT
Start: 2020-07-29 | End: 2020-07-29 | Stop reason: HOSPADM

## 2020-07-29 RX ORDER — HYDROMORPHONE HYDROCHLORIDE 2 MG/ML
0.5 INJECTION, SOLUTION INTRAMUSCULAR; INTRAVENOUS; SUBCUTANEOUS
Status: COMPLETED | OUTPATIENT
Start: 2020-07-29 | End: 2020-07-29

## 2020-07-29 RX ORDER — GLYCOPYRROLATE 0.2 MG/ML
INJECTION INTRAMUSCULAR; INTRAVENOUS AS NEEDED
Status: DISCONTINUED | OUTPATIENT
Start: 2020-07-29 | End: 2020-07-29 | Stop reason: HOSPADM

## 2020-07-29 RX ORDER — ACETAMINOPHEN 650 MG/1
650 SUPPOSITORY RECTAL ONCE
Status: COMPLETED | OUTPATIENT
Start: 2020-07-29 | End: 2020-07-29

## 2020-07-29 RX ORDER — TRAVOPROST OPHTHALMIC SOLUTION 0.04 MG/ML
1 SOLUTION OPHTHALMIC
Status: DISCONTINUED | OUTPATIENT
Start: 2020-07-29 | End: 2020-07-29 | Stop reason: CLARIF

## 2020-07-29 RX ORDER — ROCURONIUM BROMIDE 10 MG/ML
INJECTION, SOLUTION INTRAVENOUS AS NEEDED
Status: DISCONTINUED | OUTPATIENT
Start: 2020-07-29 | End: 2020-07-29 | Stop reason: HOSPADM

## 2020-07-29 RX ORDER — LABETALOL HYDROCHLORIDE 5 MG/ML
INJECTION, SOLUTION INTRAVENOUS AS NEEDED
Status: DISCONTINUED | OUTPATIENT
Start: 2020-07-29 | End: 2020-07-29 | Stop reason: HOSPADM

## 2020-07-29 RX ORDER — KETOROLAC TROMETHAMINE 15 MG/ML
INJECTION, SOLUTION INTRAMUSCULAR; INTRAVENOUS
Status: DISPENSED
Start: 2020-07-29 | End: 2020-07-29

## 2020-07-29 RX ORDER — DIPHENHYDRAMINE HYDROCHLORIDE 50 MG/ML
INJECTION, SOLUTION INTRAMUSCULAR; INTRAVENOUS
Status: DISPENSED
Start: 2020-07-29 | End: 2020-07-29

## 2020-07-29 RX ORDER — SUCCINYLCHOLINE CHLORIDE 20 MG/ML
INJECTION INTRAMUSCULAR; INTRAVENOUS AS NEEDED
Status: DISCONTINUED | OUTPATIENT
Start: 2020-07-29 | End: 2020-07-29 | Stop reason: HOSPADM

## 2020-07-29 RX ORDER — EPHEDRINE SULFATE/0.9% NACL/PF 25 MG/5 ML
SYRINGE (ML) INTRAVENOUS AS NEEDED
Status: DISCONTINUED | OUTPATIENT
Start: 2020-07-29 | End: 2020-07-29 | Stop reason: HOSPADM

## 2020-07-29 RX ORDER — LATANOPROST 50 UG/ML
1 SOLUTION/ DROPS OPHTHALMIC EVERY EVENING
Status: DISCONTINUED | OUTPATIENT
Start: 2020-07-29 | End: 2020-07-30 | Stop reason: HOSPADM

## 2020-07-29 RX ORDER — SODIUM CHLORIDE, SODIUM LACTATE, POTASSIUM CHLORIDE, CALCIUM CHLORIDE 600; 310; 30; 20 MG/100ML; MG/100ML; MG/100ML; MG/100ML
75 INJECTION, SOLUTION INTRAVENOUS CONTINUOUS
Status: DISCONTINUED | OUTPATIENT
Start: 2020-07-29 | End: 2020-07-29 | Stop reason: HOSPADM

## 2020-07-29 RX ORDER — PROPOFOL 10 MG/ML
INJECTION, EMULSION INTRAVENOUS AS NEEDED
Status: DISCONTINUED | OUTPATIENT
Start: 2020-07-29 | End: 2020-07-29 | Stop reason: HOSPADM

## 2020-07-29 RX ORDER — DEXAMETHASONE SODIUM PHOSPHATE 4 MG/ML
INJECTION, SOLUTION INTRA-ARTICULAR; INTRALESIONAL; INTRAMUSCULAR; INTRAVENOUS; SOFT TISSUE AS NEEDED
Status: DISCONTINUED | OUTPATIENT
Start: 2020-07-29 | End: 2020-07-29 | Stop reason: HOSPADM

## 2020-07-29 RX ORDER — LABETALOL HCL 20 MG/4 ML
SYRINGE (ML) INTRAVENOUS
Status: COMPLETED
Start: 2020-07-29 | End: 2020-07-29

## 2020-07-29 RX ORDER — SERTRALINE HYDROCHLORIDE 50 MG/1
150 TABLET, FILM COATED ORAL DAILY
Status: DISCONTINUED | OUTPATIENT
Start: 2020-07-30 | End: 2020-07-30 | Stop reason: HOSPADM

## 2020-07-29 RX ORDER — TRAVOPROST OPHTHALMIC SOLUTION 0.04 MG/ML
1 SOLUTION OPHTHALMIC
Status: DISCONTINUED | OUTPATIENT
Start: 2020-07-29 | End: 2020-07-29

## 2020-07-29 RX ORDER — SODIUM CHLORIDE, SODIUM LACTATE, POTASSIUM CHLORIDE, CALCIUM CHLORIDE 600; 310; 30; 20 MG/100ML; MG/100ML; MG/100ML; MG/100ML
50 INJECTION, SOLUTION INTRAVENOUS CONTINUOUS
Status: DISCONTINUED | OUTPATIENT
Start: 2020-07-29 | End: 2020-07-29 | Stop reason: HOSPADM

## 2020-07-29 RX ORDER — FENTANYL CITRATE 50 UG/ML
50 INJECTION, SOLUTION INTRAMUSCULAR; INTRAVENOUS AS NEEDED
Status: DISCONTINUED | OUTPATIENT
Start: 2020-07-29 | End: 2020-07-29 | Stop reason: HOSPADM

## 2020-07-29 RX ORDER — ENOXAPARIN SODIUM 100 MG/ML
40 INJECTION SUBCUTANEOUS DAILY
Qty: 7 SYRINGE | Refills: 0 | Status: SHIPPED | OUTPATIENT
Start: 2020-07-29 | End: 2020-08-05

## 2020-07-29 RX ORDER — SODIUM CHLORIDE, SODIUM LACTATE, POTASSIUM CHLORIDE, CALCIUM CHLORIDE 600; 310; 30; 20 MG/100ML; MG/100ML; MG/100ML; MG/100ML
150 INJECTION, SOLUTION INTRAVENOUS CONTINUOUS
Status: DISCONTINUED | OUTPATIENT
Start: 2020-07-29 | End: 2020-07-30 | Stop reason: HOSPADM

## 2020-07-29 RX ORDER — ACETAMINOPHEN 325 MG/1
650 TABLET ORAL EVERY 6 HOURS
Status: DISCONTINUED | OUTPATIENT
Start: 2020-07-29 | End: 2020-07-30 | Stop reason: HOSPADM

## 2020-07-29 RX ORDER — DIPHENHYDRAMINE HYDROCHLORIDE 50 MG/ML
25 INJECTION, SOLUTION INTRAMUSCULAR; INTRAVENOUS ONCE
Status: COMPLETED | OUTPATIENT
Start: 2020-07-29 | End: 2020-07-29

## 2020-07-29 RX ADMIN — MIDAZOLAM HYDROCHLORIDE 2 MG: 2 INJECTION, SOLUTION INTRAMUSCULAR; INTRAVENOUS at 07:21

## 2020-07-29 RX ADMIN — HYDROMORPHONE HYDROCHLORIDE 0.5 MG: 2 INJECTION, SOLUTION INTRAMUSCULAR; INTRAVENOUS; SUBCUTANEOUS at 09:55

## 2020-07-29 RX ADMIN — KETOROLAC TROMETHAMINE 15 MG: 15 INJECTION, SOLUTION INTRAMUSCULAR; INTRAVENOUS at 22:13

## 2020-07-29 RX ADMIN — HYDROMORPHONE HYDROCHLORIDE 0.5 MG: 2 INJECTION, SOLUTION INTRAMUSCULAR; INTRAVENOUS; SUBCUTANEOUS at 10:15

## 2020-07-29 RX ADMIN — FAMOTIDINE: 10 INJECTION, SOLUTION INTRAVENOUS at 06:22

## 2020-07-29 RX ADMIN — ENOXAPARIN SODIUM 40 MG: 40 INJECTION SUBCUTANEOUS at 06:25

## 2020-07-29 RX ADMIN — KETOROLAC TROMETHAMINE 15 MG: 15 INJECTION, SOLUTION INTRAMUSCULAR; INTRAVENOUS at 16:37

## 2020-07-29 RX ADMIN — ONDANSETRON 4 MG: 2 INJECTION INTRAMUSCULAR; INTRAVENOUS at 09:56

## 2020-07-29 RX ADMIN — LABETALOL 20 MG/4 ML (5 MG/ML) INTRAVENOUS SYRINGE 10 MG: at 10:14

## 2020-07-29 RX ADMIN — ROCURONIUM BROMIDE 10 MG: 50 INJECTION INTRAVENOUS at 08:10

## 2020-07-29 RX ADMIN — DIPHENHYDRAMINE HYDROCHLORIDE 25 MG: 50 INJECTION, SOLUTION INTRAMUSCULAR; INTRAVENOUS at 10:26

## 2020-07-29 RX ADMIN — KETOROLAC TROMETHAMINE 15 MG: 15 INJECTION, SOLUTION INTRAMUSCULAR; INTRAVENOUS at 10:12

## 2020-07-29 RX ADMIN — CEFAZOLIN 3 G: 1 INJECTION, POWDER, FOR SOLUTION INTRAMUSCULAR; INTRAVENOUS; PARENTERAL at 07:39

## 2020-07-29 RX ADMIN — LABETALOL HYDROCHLORIDE 5 MG: 5 INJECTION, SOLUTION INTRAVENOUS at 08:42

## 2020-07-29 RX ADMIN — SUCCINYLCHOLINE CHLORIDE 140 MG: 20 INJECTION, SOLUTION INTRAMUSCULAR; INTRAVENOUS at 07:27

## 2020-07-29 RX ADMIN — FENTANYL CITRATE 100 MCG: 50 INJECTION, SOLUTION INTRAMUSCULAR; INTRAVENOUS at 07:27

## 2020-07-29 RX ADMIN — ROCURONIUM BROMIDE 25 MG: 50 INJECTION INTRAVENOUS at 07:37

## 2020-07-29 RX ADMIN — GLYCOPYRROLATE 0.4 MG: 0.2 INJECTION INTRAMUSCULAR; INTRAVENOUS at 09:36

## 2020-07-29 RX ADMIN — OXYCODONE HYDROCHLORIDE 5 MG: 5 TABLET ORAL at 14:03

## 2020-07-29 RX ADMIN — LIDOCAINE HYDROCHLORIDE 100 MG: 20 INJECTION, SOLUTION EPIDURAL; INFILTRATION; INTRACAUDAL; PERINEURAL at 07:27

## 2020-07-29 RX ADMIN — PROPOFOL 200 MG: 10 INJECTION, EMULSION INTRAVENOUS at 07:27

## 2020-07-29 RX ADMIN — OXYCODONE HYDROCHLORIDE 5 MG: 5 TABLET ORAL at 19:46

## 2020-07-29 RX ADMIN — SODIUM CHLORIDE, SODIUM LACTATE, POTASSIUM CHLORIDE, AND CALCIUM CHLORIDE 150 ML/HR: 600; 310; 30; 20 INJECTION, SOLUTION INTRAVENOUS at 19:47

## 2020-07-29 RX ADMIN — SODIUM CHLORIDE, SODIUM LACTATE, POTASSIUM CHLORIDE, AND CALCIUM CHLORIDE: 600; 310; 30; 20 INJECTION, SOLUTION INTRAVENOUS at 08:47

## 2020-07-29 RX ADMIN — ROCURONIUM BROMIDE 5 MG: 50 INJECTION INTRAVENOUS at 08:50

## 2020-07-29 RX ADMIN — HYDROMORPHONE HYDROCHLORIDE 0.5 MG: 2 INJECTION, SOLUTION INTRAMUSCULAR; INTRAVENOUS; SUBCUTANEOUS at 09:52

## 2020-07-29 RX ADMIN — DEXAMETHASONE SODIUM PHOSPHATE 4 MG: 4 INJECTION, SOLUTION INTRAMUSCULAR; INTRAVENOUS at 07:45

## 2020-07-29 RX ADMIN — HYDROMORPHONE HYDROCHLORIDE 0.5 MG: 2 INJECTION, SOLUTION INTRAMUSCULAR; INTRAVENOUS; SUBCUTANEOUS at 07:59

## 2020-07-29 RX ADMIN — HYDROMORPHONE HYDROCHLORIDE 0.5 MG: 2 INJECTION, SOLUTION INTRAMUSCULAR; INTRAVENOUS; SUBCUTANEOUS at 09:49

## 2020-07-29 RX ADMIN — HYOSCYAMINE SULFATE 0.12 MG: 0.12 TABLET ORAL; SUBLINGUAL at 22:13

## 2020-07-29 RX ADMIN — ACETAMINOPHEN 650 MG: 325 TABLET ORAL at 18:00

## 2020-07-29 RX ADMIN — LABETALOL HYDROCHLORIDE 5 MG: 5 INJECTION, SOLUTION INTRAVENOUS at 08:40

## 2020-07-29 RX ADMIN — HYDROMORPHONE HYDROCHLORIDE 0.5 MG: 2 INJECTION, SOLUTION INTRAMUSCULAR; INTRAVENOUS; SUBCUTANEOUS at 08:00

## 2020-07-29 RX ADMIN — HYOSCYAMINE SULFATE 0.12 MG: 0.12 TABLET ORAL; SUBLINGUAL at 14:03

## 2020-07-29 RX ADMIN — ONDANSETRON 4 MG: 2 INJECTION INTRAMUSCULAR; INTRAVENOUS at 09:16

## 2020-07-29 RX ADMIN — FENTANYL CITRATE 50 MCG: 50 INJECTION, SOLUTION INTRAMUSCULAR; INTRAVENOUS at 08:07

## 2020-07-29 RX ADMIN — Medication 5 MG: at 07:52

## 2020-07-29 RX ADMIN — LABETALOL HYDROCHLORIDE 5 MG: 5 INJECTION, SOLUTION INTRAVENOUS at 08:46

## 2020-07-29 RX ADMIN — SODIUM CHLORIDE, SODIUM LACTATE, POTASSIUM CHLORIDE, AND CALCIUM CHLORIDE 150 ML/HR: 600; 310; 30; 20 INJECTION, SOLUTION INTRAVENOUS at 12:00

## 2020-07-29 RX ADMIN — LABETALOL 20 MG/4 ML (5 MG/ML) INTRAVENOUS SYRINGE 10 MG: at 10:30

## 2020-07-29 RX ADMIN — SODIUM CHLORIDE, SODIUM LACTATE, POTASSIUM CHLORIDE, AND CALCIUM CHLORIDE: 600; 310; 30; 20 INJECTION, SOLUTION INTRAVENOUS at 08:04

## 2020-07-29 RX ADMIN — SODIUM CHLORIDE, SODIUM LACTATE, POTASSIUM CHLORIDE, AND CALCIUM CHLORIDE 50 ML/HR: 600; 310; 30; 20 INJECTION, SOLUTION INTRAVENOUS at 06:21

## 2020-07-29 RX ADMIN — Medication 4 MG: at 09:36

## 2020-07-29 RX ADMIN — HYDROMORPHONE HYDROCHLORIDE 0.5 MG: 2 INJECTION, SOLUTION INTRAMUSCULAR; INTRAVENOUS; SUBCUTANEOUS at 10:25

## 2020-07-29 RX ADMIN — HYDROMORPHONE HYDROCHLORIDE 0.5 MG: 2 INJECTION, SOLUTION INTRAMUSCULAR; INTRAVENOUS; SUBCUTANEOUS at 10:05

## 2020-07-29 RX ADMIN — FENTANYL CITRATE 50 MCG: 50 INJECTION, SOLUTION INTRAMUSCULAR; INTRAVENOUS at 08:28

## 2020-07-29 RX ADMIN — ROCURONIUM BROMIDE 5 MG: 50 INJECTION INTRAVENOUS at 07:27

## 2020-07-29 NOTE — ANESTHESIA PREPROCEDURE EVALUATION
Anesthetic History          Comments: Sore throat after surgery in August     Review of Systems / Medical History  Patient summary reviewed, nursing notes reviewed and pertinent labs reviewed    Pulmonary        Sleep apnea        Comments: Pt denies sleep apnea   Neuro/Psych         Psychiatric history    Comments: depression Cardiovascular  Within defined limits                Exercise tolerance: >4 METS     GI/Hepatic/Renal  Within defined limits              Endo/Other        Morbid obesity and anemia    Comments: Anemia per chart review, menorrhagia Other Findings              Physical Exam    Airway  Mallampati: II    Neck ROM: normal range of motion   Mouth opening: Normal     Cardiovascular  Regular rate and rhythm,  S1 and S2 normal,  no murmur, click, rub, or gallop             Dental  No notable dental hx       Pulmonary  Breath sounds clear to auscultation               Abdominal  GI exam deferred       Other Findings            Anesthetic Plan    ASA: 3  Anesthesia type: general          Induction: Intravenous  Anesthetic plan and risks discussed with: Patient

## 2020-07-29 NOTE — PROGRESS NOTES
End of Shift Note     Bedside and verbal shift change report given to [address] RN(On coming nurse) by Getachew Fairbanks RN (Off going nurse).

## 2020-07-29 NOTE — ANESTHESIA POSTPROCEDURE EVALUATION
Procedure(s):  LAPAROSCOPIC NIKHIL-EN-Y GASTRIC BYPASS. general    Anesthesia Post Evaluation      Multimodal analgesia: multimodal analgesia used between 6 hours prior to anesthesia start to PACU discharge  Patient location during evaluation: bedside  Patient participation: complete - patient participated  Level of consciousness: awake  Pain management: adequate  Airway patency: patent  Anesthetic complications: no  Cardiovascular status: stable  Respiratory status: acceptable  Hydration status: acceptable  Post anesthesia nausea and vomiting:  controlled      INITIAL Post-op Vital signs:   Vitals Value Taken Time   /87 7/29/2020 10:09 AM   Temp 37.1 °C (98.7 °F) 7/29/2020  9:49 AM   Pulse 90 7/29/2020 10:19 AM   Resp 23 7/29/2020 10:19 AM   SpO2 97 % 7/29/2020 10:19 AM   Vitals shown include unvalidated device data.

## 2020-07-29 NOTE — PROGRESS NOTES
Problem: Patient Education: Go to Patient Education Activity  Goal: Patient/Family Education  Outcome: Progressing Towards Goal     Problem: Laparoscopic Gastric Bypass:Day of Surgery  Goal: Off Pathway (Use only if patient is Off Pathway)  Outcome: Progressing Towards Goal  Goal: Activity/Safety  Outcome: Progressing Towards Goal  Goal: Consults, if ordered  Outcome: Progressing Towards Goal  Goal: Diagnostic Test/Procedures  Outcome: Progressing Towards Goal  Goal: Nutrition/Diet  Outcome: Progressing Towards Goal  Goal: Medications  Outcome: Progressing Towards Goal  Goal: Respiratory  Outcome: Progressing Towards Goal  Goal: Treatments/Interventions/Procedures  Outcome: Progressing Towards Goal  Goal: Psychosocial  Outcome: Progressing Towards Goal  Goal: *No signs and symptoms of infection or wound complications  Outcome: Progressing Towards Goal  Goal: *Optimal pain control at patient's stated goal  Outcome: Progressing Towards Goal  Goal: *Adequate urinary output (equal to or greater than 30 milliliters/hour)  Outcome: Progressing Towards Goal  Goal: *Hemodynamically stable  Outcome: Progressing Towards Goal  Goal: *Tolerating diet  Outcome: Progressing Towards Goal  Goal: *Demonstrates progressive activity  Outcome: Progressing Towards Goal  Goal: *Absence of signs and symptoms of DVT  Outcome: Progressing Towards Goal  Goal: *Labs within defined limits  Outcome: Progressing Towards Goal  Goal: *Oxygen saturation within defined limits  Outcome: Progressing Towards Goal     Problem: Laparoscopic Gastric Bypass:Post-Op Day 1  Goal: Off Pathway (Use only if patient is Off Pathway)  Outcome: Progressing Towards Goal  Goal: Activity/Safety  Outcome: Progressing Towards Goal  Goal: Diagnostic Test/Procedures  Outcome: Progressing Towards Goal  Goal: Nutrition/Diet  Outcome: Progressing Towards Goal  Goal: Discharge Planning  Outcome: Progressing Towards Goal  Goal: Medications  Outcome: Progressing Towards Goal  Goal: Respiratory  Outcome: Progressing Towards Goal  Goal: Treatments/Interventions/Procedures  Outcome: Progressing Towards Goal  Goal: Psychosocial  Outcome: Progressing Towards Goal  Goal: *No signs and symptoms of infection or wound complications  Outcome: Progressing Towards Goal  Goal: *Optimal pain control at patient's stated goal  Outcome: Progressing Towards Goal  Goal: *Adequate urinary output (equal to or greater than 30 milliliters/hour)  Outcome: Progressing Towards Goal  Goal: *Hemodynamically stable  Outcome: Progressing Towards Goal  Goal: *Tolerating diet  Outcome: Progressing Towards Goal  Goal: *Demonstrates progressive activity  Outcome: Progressing Towards Goal  Goal: *Absence of signs and symptoms of DVT  Outcome: Progressing Towards Goal  Goal: *Labs within defined limits  Outcome: Progressing Towards Goal  Goal: *Oxygen saturation within defined limits  Outcome: Progressing Towards Goal  Goal: *Upper GI series/No leak identified  Outcome: Progressing Towards Goal     Problem: Laparoscopic Gastric Bypass:Post-Op Day 2  Goal: Off Pathway (Use only if patient is Off Pathway)  Outcome: Progressing Towards Goal  Goal: Activity/Safety  Outcome: Progressing Towards Goal  Goal: Diagnostic Test/Procedures  Outcome: Progressing Towards Goal  Goal: Nutrition/Diet  Outcome: Progressing Towards Goal  Goal: Discharge Planning  Outcome: Progressing Towards Goal  Goal: Medications  Outcome: Progressing Towards Goal  Goal: Respiratory  Outcome: Progressing Towards Goal  Goal: Treatments/Interventions/Procedures  Outcome: Progressing Towards Goal  Goal: Psychosocial  Outcome: Progressing Towards Goal     Problem: Laparoscopic Gastric Bypass:Discharge Outcomes  Goal: *Active bowel sounds  Outcome: Progressing Towards Goal  Goal: *Absence of signs and symptoms of DVT  Outcome: Progressing Towards Goal  Goal: *Hemodynamically stable  Outcome: Progressing Towards Goal  Goal: *Lungs clear or at baseline  Outcome: Progressing Towards Goal  Goal: *Demonstrates independent activity or return to baseline  Outcome: Progressing Towards Goal  Goal: *Optimal pain control at patient's stated goal  Outcome: Progressing Towards Goal  Goal: *Verbalizes understanding and describes prescribed diet  Outcome: Progressing Towards Goal  Goal: *Tolerating diet  Outcome: Progressing Towards Goal  Goal: *Verbalizes name, dosage, time, side effects, and number of days to continue medications  Outcome: Progressing Towards Goal  Goal: *No signs and symptoms of infection or wound complications  Outcome: Progressing Towards Goal  Goal: *Anxiety reduced or absent  Outcome: Progressing Towards Goal  Goal: *Understands and describes signs and symptoms to report to providers (eg: s/s of leak, DVT; inability to tolerate diet)  Outcome: Progressing Towards Goal  Goal: *Describes follow-up/return visits to physicians  Outcome: Progressing Towards Goal  Goal: *Describes available resources and support systems  Outcome: Progressing Towards Goal

## 2020-07-29 NOTE — PROGRESS NOTES
TRANSFER - IN REPORT:    Verbal report received from [facility] ÁNGEL) on [employer]  being received from [facility]) for routine post - op      Report consisted of patients Situation, Background, Assessment and   Recommendations(SBAR). Information from the following report(s) SBAR, Procedure Summary and MAR was reviewed with the receiving nurse. Opportunity for questions and clarification was provided. Assessment completed upon patients arrival to unit and care assumed.

## 2020-07-29 NOTE — PERIOP NOTES
TRANSFER - OUT REPORT:    Verbal report given to Gwendolyn Jang RN(name) on [employer]  being transferred to 2 surgical(unit) for routine post - op       Report consisted of patients Situation, Background, Assessment and   Recommendations(SBAR). Information from the following report(s) SBAR, Kardex, OR Summary, Procedure Summary, Intake/Output, MAR and Recent Results was reviewed with the receiving nurse. Lines:   Peripheral IV 07/29/20 Left Hand (Active)   Site Assessment Clean, dry, & intact 07/29/20 0949   Phlebitis Assessment 0 07/29/20 0949   Infiltration Assessment 0 07/29/20 0949   Dressing Status Clean, dry, & intact 07/29/20 0949   Dressing Type Tape;Transparent 07/29/20 0949   Hub Color/Line Status Pink; Infusing 07/29/20 0949        Opportunity for questions and clarification was provided.       Patient transported with:   Monitor  O2 @ 2 liters  Tech

## 2020-07-29 NOTE — PROGRESS NOTES
Latanoprost (Xalatan) 0.005% was therapeutically interchanged for Travoprost (Travatan Z) 0.004 % per the P&T Committee approved Therapeutic Interchanges Policy.

## 2020-07-29 NOTE — INTERVAL H&P NOTE
Update History & Physical    The Patient's History and Physical  was reviewed with the patient and I examined the patient. There was no change. The surgical site was confirmed by the patient and me. Plan:  The risk, benefits, expected outcome, and alternative to the recommended procedure have been discussed with the patient. Patient understands and wants to proceed with the procedure.     Electronically signed by Josse Rasmussen MD on 7/29/2020 at 7:09 AM

## 2020-07-29 NOTE — OP NOTES
DATE: 7/29/2020  [facility] HAS DEMANDED TO CLARIFY WHETHER INTERNS OR RESIDENTS ARE AVAILABLE FOR ASSISTING FOR ANY CASE SUBMITTED TO THEM FOR PAYMENT. TO CLARIFY, I DO NOT TEACH RESIDENTS NOR INTERNS. THEY ARE NOT NOW, IN THE PAST NOR AT ANY ANTICIPATED POINT IN THE FUTURE, AVAILABLE TO PROVIDE ASSISTANCE IN THE OPERATING ROOM FOR SURGICAL CASES THAT I PERFORM. PREOPERATIVE DIAGNOSIS: Clinically severe obesity, body mass index of 49,   comorbidities of stress urinary incontinence. POSTOPERATIVE DIAGNOSIS: Clinically severe obesity, body mass index of 49,   comorbidities of stress urinary incontinence  PROCEDURES: Laparoscopic Merlin-en-Y gastric bypass with 195 cm retrocolic   retrogastric Merlin limb, 40 cm biliopancreatic limb, 15 ml    tubularized gastric pouch applied to the lesser curvature of stomach  SURGEON: Dr. Montana Chavez. Marga Gitelman, MD, FACS   ASSISTANTGeraline Dear,   ANESTHESIA: General endotracheal anesthesia. Local anesthetic Exparel 20 mL. FINDINGS: NOne  SPECIMEN: None  IMPLANTS: * No implants in log *  ESTIMATED BLOOD LOSS: 25 ml  FLUIDS: 2200 ml   URINE OUTPUT: 250 ml   DRAIN: None  COMPLICATIONS: None  OPERATIVE START TIME: 8094  OPERATIVE COMPLETION TIME: 0939    DESCRIPTION OF PROCEDURE: The patient was prepped and draped in standard sterile fashion after being placed under general anesthetic. A site was selected superior to the umbilicus in the midline. This area was incised. A 5  mm Optical trocar was placed over the laparoscope and directed into the abdominal cavity using Optiview technique. Abdomen was insufflated to 15 mmHg and surveyed. There was no evidence of injury from the entry. A bilateral subcostal laparoscopic visualized transversus abdominus/pre-peritoneal space block was placed using 20 ml of Exparel diluted with 80 ml of normal saline without difficulty. Additional trocars were then placed.  My assistant place one 5 mm trocar was placed in the anterior axillary line left upper quadrant approximately 3 fingerbreadths below the costal margin and another 12 mm trocar was placed in the lateral portion of the left rectus sheath approximately 3 fingerbreadths lateral to the umbilical trocar. I then placed another 12 mm trocar was placed approximately 4 fingerbreadths lateral to the umbilical trocar in the  lateral portion of right rectus sheath. All were placed after incising with scalpel, all were placed using blunt dissecting technique. There was no evidence of injury from the entries. Instrument were placed in the abdominal cavity. The omentum and transverse colon were retracted superiorly, exposing ligament of Treitz. The small bowel was measured 40 cm distal to the ligament of Treitz, divided at this level with A 60 mm [place] stapler. While my assistant exposed the root of the mesentery, I used. harmonic dissection was used to continue the division to the base of the mesentery, confirming preservation of blood flow to the small bowel above and below the staple line prior to firing. Then, from the distal staple line, the Merlin limb was measured 100 cm distal and an antimesenteric enterotomy was made. Another antimesenteric enterotomy was made just proximal to the proximal staple line of the biliopancreatic limb. Through these enterotomies, a 60 mm [place] stapler was placed into the bowel, clamped on the antimesenteric borders and fired to form a stapled side-to-side anastomosis. The remaining enterotomy was closed in a running inverting fashion with 3-0 Vicryl suture. The mesenteric defect was then closed with running 2-0 silk suture, extending on the bowel wall in a running Lembert fashion for second layer closure of the enterotomy. At this point, attention was directed to the transverse mesocolon. While my assistant exposed the bare area of the transverse mesentery above the ligament of Trietz. A site was selected just anterior to the ligament of Treitz.  This area was incised, entering the lesser sac. The Merlin limb was then passed through the fenestration of the transverse mesocolon and into the lesser sac taking care not to twist on its mesentery. At this point, the omentum and transverse colon were retracted inferiorly. The greater curvature of the stomach was then grasped and the gastrocolic ligament was retracted by my assistant and I then  dissected the ligament directly off of the wall of the stomach using the Harmonic scalpel to widely open the lesser sac. My assistant exposed the depths of the lesser sac to allow me to take down adhesions between the posterior wall of the stomach and the retroperitoneum down under direct vision to fully free the lesser sac. At this point, an incision was made near the xiphoid. Through this incision, a Tyrese retractor was placed through the abdominal wall beneath the left lateral segment of the liver and connected to a bedside retraction system allowing exposure of the esophageal hiatus. While my assistant retracted the stomach and lesser omentum inferiorly, I dissected the angle of His  anterior to posterior down the left bernadine of the diaphragm using Harmonic scalpel to assist in eventual division of the gastric pouch and distal stomach remnant. The lesser curvature of the stomach was then examined. A site was selected just proximal to the crow's foot of the vagus nerve in this area. The gastrohepatic ligament was dissected away from the lesser curvature of the stomach directly on the wall of the stomach to enter the lesser sac. This fenestration was then widened using Bovie cautery over a 10 mm articulating esophageal retractor which had been placed through the lesser sac by my assistant and brought through the fenestration with counter traction by both of us.      Then, a 60 mm [place] stapler was placed transversely across the stomach through this fenestration, clamped and then fired to begin the formation of the gastric pouch. Another stapler was placed near the crotch of the previous staple line and directed superiorly toward the angle of His, and clamped. Prior to firing, a 34-Uzbek orogastric Simón tube was brought through the esophagus into the stomach so its tip was against the transverse staple line, its course lying along the lesser curvature of stomach. The stapler was snugged against it and then fired. Then, a 60 mm [place] stapler with Seam Guard reinforcement was placed in the crotch of the previous staple line and clamped and then fired from the crotch of previous staple line directed superiorly toward the angle of His until the gastric pouch was fully divided from the distal stomach remnant while my assistant exposed the lesser sac to prevent injury to the hiatus, spleen and liver. Once fully divided, the staple lines were examined, noted to be hemostatic and viable. My assistant placed the lesser omentum between the staple lines to prevent gastro-gastric fistulization. The tip of the gastric pouch placed in the lesser sac and the distal stomach remnant was retracted anteriorly to allow exposure of the lesser sac. The Kathy limb was examined. The proximal 9 cm were noted to be tethered by its mesentery. This proximal segment was elevated by my assistant and then I excised from the mesentery using a Harmonic scalpel and then one additional staple load was used to divide the devascularized segment from the remainder of the Kathy limb. This segment was brought out of the abdominal cavity via one of the trocar sites, passed off the field and discarded. The kathy limb mesentery was examined to confirm there was no twisting of the mesentery prior to sewing the anastomosis    At this point, the gastrojejunostomy was begun by sewing the right antimesenteric border of the Kathy limb to the distal portion of the gastric pouch using running suture of 3-0 Vicryl.  An anterior gastrotomy and antimesenteric enterotomy were made. From the left apex of these 2 enterotomies, a 3-0 Vicryl suture was placed and run on the posterior surface in a running inverting fashion to the right apex, transitioned on the anterior surface and sewn approximately half way across the opening in an inverting fashion. Then, another 3-0 Vicryl suture was placed at the left apex and sewn to the previous suture in a running inverting fashion on the anterior surface. Prior to tying these sutures, the Simón tube was brought across the anastomosis, then the sutures were tied, completing the inner layer. Then, from the left apex another 3-0 Vicryl suture was placed and run to the right apex in a running Lembert fashion completing the anterior outer layer os the anastomosis, thereby completing the anastomosis. Once this was complete, the Simón tube was removed from the patient. Then, working from within the lesser sac, the Kathy limb was sewn to the right anterior surface of the transverse mesocolic defect with  3 interrupted sutures of 2-0 silk. Then, the left side of the kathy limb mesentery was closed to the left side of the tranverse mesocolic defect with a running suture of 2-0 silk while my assistant exposed the defects from within the lesser sac. The omentum and transverse colon were retracted superiorly. The base of the left side of the transverse mesocolic defect was exposed by my assistant and this was sewn to the base of the left side of the Kathy limb mesentery with a pursestring suture of 2-0 silk. The Kathy limb was then retracted to the left. The right-side of the  transverse mesocolic defect was exposed by my assistant and closed to the right side of the Kathy limb mesentery with another pursestring suture of 2-0 silk, then one additional interrupted suture was placed between the right lateral surface of the Kathy limb and the right lateral surface of the transverse mesocolic defect, completing the closure of the defect around the Kathy limb. Once this was complete, both mesenteric defects were examined and noted to be well closed. Both anastomoses were examined and noted to be hemostatic and viable without evidence of leak. The omentum and transverse colon were retracted inferiorly back to normal position. The gastric remnant was placed over the anastomosis, returning the anastomosis into the lesser sac. The Tyrese retractor was removed. At this point, the abdomen was desufflated via the remaining trocars. All trocars were then removed. The trocar sites were rendered hemostatic and then closed by my assistant with interrupted 4-0 Monocryl deep dermal sutures. Dermabond was applied as wound dressings. The patient tolerated the procedure well.

## 2020-07-30 VITALS
OXYGEN SATURATION: 96 % | RESPIRATION RATE: 18 BRPM | HEART RATE: 82 BPM | DIASTOLIC BLOOD PRESSURE: 84 MMHG | BODY MASS INDEX: 46.1 KG/M2 | TEMPERATURE: 98.7 F | HEIGHT: 64 IN | SYSTOLIC BLOOD PRESSURE: 167 MMHG | WEIGHT: 270 LBS

## 2020-07-30 LAB
ANION GAP SERPL CALC-SCNC: 7 MMOL/L (ref 3–18)
BUN SERPL-MCNC: 3 MG/DL (ref 7–18)
BUN/CREAT SERPL: 7 (ref 12–20)
CALCIUM SERPL-MCNC: 8.7 MG/DL (ref 8.5–10.1)
CHLORIDE SERPL-SCNC: 103 MMOL/L (ref 100–111)
CO2 SERPL-SCNC: 28 MMOL/L (ref 21–32)
CREAT SERPL-MCNC: 0.43 MG/DL (ref 0.6–1.3)
ERYTHROCYTE [DISTWIDTH] IN BLOOD BY AUTOMATED COUNT: 16.3 % (ref 11.6–14.5)
GLUCOSE SERPL-MCNC: 80 MG/DL (ref 74–99)
HCT VFR BLD AUTO: 35.2 % (ref 35–45)
HGB BLD-MCNC: 11.4 G/DL (ref 12–16)
MCH RBC QN AUTO: 23.7 PG (ref 24–34)
MCHC RBC AUTO-ENTMCNC: 32.4 G/DL (ref 31–37)
MCV RBC AUTO: 73.2 FL (ref 74–97)
PLATELET # BLD AUTO: 353 K/UL (ref 135–420)
PMV BLD AUTO: 11.5 FL (ref 9.2–11.8)
POTASSIUM SERPL-SCNC: 3.1 MMOL/L (ref 3.5–5.5)
RBC # BLD AUTO: 4.81 M/UL (ref 4.2–5.3)
SODIUM SERPL-SCNC: 138 MMOL/L (ref 136–145)
WBC # BLD AUTO: 9.6 K/UL (ref 4.6–13.2)

## 2020-07-30 PROCEDURE — 74011250637 HC RX REV CODE- 250/637: Performed by: SURGERY

## 2020-07-30 PROCEDURE — 36415 COLL VENOUS BLD VENIPUNCTURE: CPT

## 2020-07-30 PROCEDURE — 85027 COMPLETE CBC AUTOMATED: CPT

## 2020-07-30 PROCEDURE — 80048 BASIC METABOLIC PNL TOTAL CA: CPT

## 2020-07-30 PROCEDURE — 74011250636 HC RX REV CODE- 250/636: Performed by: SURGERY

## 2020-07-30 PROCEDURE — 74011250637 HC RX REV CODE- 250/637: Performed by: NURSE PRACTITIONER

## 2020-07-30 PROCEDURE — 74011000250 HC RX REV CODE- 250: Performed by: SURGERY

## 2020-07-30 PROCEDURE — C9113 INJ PANTOPRAZOLE SODIUM, VIA: HCPCS | Performed by: SURGERY

## 2020-07-30 RX ORDER — HYOSCYAMINE SULFATE 0.12 MG/1
0.12 TABLET SUBLINGUAL
Qty: 12 TAB | Refills: 0 | Status: SHIPPED | OUTPATIENT
Start: 2020-07-30 | End: 2021-04-05 | Stop reason: ALTCHOICE

## 2020-07-30 RX ORDER — OXYCODONE HYDROCHLORIDE 5 MG/1
5 TABLET ORAL
Qty: 12 TAB | Refills: 0 | Status: SHIPPED | OUTPATIENT
Start: 2020-07-30 | End: 2020-08-02

## 2020-07-30 RX ORDER — POTASSIUM CHLORIDE 20 MEQ/1
40 TABLET, EXTENDED RELEASE ORAL
Status: COMPLETED | OUTPATIENT
Start: 2020-07-30 | End: 2020-07-30

## 2020-07-30 RX ORDER — ONDANSETRON 4 MG/1
4 TABLET, ORALLY DISINTEGRATING ORAL
Qty: 15 TAB | Refills: 0 | Status: SHIPPED | OUTPATIENT
Start: 2020-07-30 | End: 2020-12-02 | Stop reason: ALTCHOICE

## 2020-07-30 RX ADMIN — ACETAMINOPHEN 650 MG: 325 TABLET ORAL at 06:34

## 2020-07-30 RX ADMIN — POTASSIUM CHLORIDE 40 MEQ: 1500 TABLET, EXTENDED RELEASE ORAL at 11:08

## 2020-07-30 RX ADMIN — SERTRALINE 150 MG: 50 TABLET, FILM COATED ORAL at 09:08

## 2020-07-30 RX ADMIN — SODIUM CHLORIDE, SODIUM LACTATE, POTASSIUM CHLORIDE, AND CALCIUM CHLORIDE 150 ML/HR: 600; 310; 30; 20 INJECTION, SOLUTION INTRAVENOUS at 09:19

## 2020-07-30 RX ADMIN — ACETAMINOPHEN 650 MG: 325 TABLET ORAL at 00:36

## 2020-07-30 RX ADMIN — SODIUM CHLORIDE 40 MG: 9 INJECTION, SOLUTION INTRAMUSCULAR; INTRAVENOUS; SUBCUTANEOUS at 09:08

## 2020-07-30 RX ADMIN — OXYCODONE HYDROCHLORIDE 5 MG: 5 TABLET ORAL at 11:08

## 2020-07-30 RX ADMIN — ENOXAPARIN SODIUM 40 MG: 40 INJECTION SUBCUTANEOUS at 09:08

## 2020-07-30 RX ADMIN — ONDANSETRON 4 MG: 2 INJECTION INTRAMUSCULAR; INTRAVENOUS at 03:02

## 2020-07-30 RX ADMIN — KETOROLAC TROMETHAMINE 15 MG: 15 INJECTION, SOLUTION INTRAMUSCULAR; INTRAVENOUS at 03:08

## 2020-07-30 NOTE — PROGRESS NOTES
Patient was educated on progression of diet this AM. Goal of 4 ounces per hour with one ounce being protein was clearly understood. Patient was instructed to go slow with small sips to reach goal. Patient given a report card to record intake. Education completed on I.S use and to ambulate in meneses at least 4 times. Will follow up and check progression.

## 2020-07-30 NOTE — PROGRESS NOTES
Discharge order noted for today. Orders reviewed. No needs identified at this time.  remains available if needed.   Janey Werner RN - Outcomes Manager  [phone]

## 2020-07-30 NOTE — ROUTINE PROCESS
Bedside and Verbal shift change report given to [address] (oncoming nurse) by Lisandro Dorado RN (offgoing nurse). Report included the following information SBAR, Kardex, Procedure Summary, Intake/Output, MAR and Recent Results.

## 2020-07-30 NOTE — PROGRESS NOTES
Problem: Patient Education: Go to Patient Education Activity  Goal: Patient/Family Education  Outcome: Progressing Towards Goal     Problem: Laparoscopic Gastric Bypass:Post-Op Day 1  Goal: Off Pathway (Use only if patient is Off Pathway)  Outcome: Progressing Towards Goal  Goal: Activity/Safety  Outcome: Progressing Towards Goal  Goal: Diagnostic Test/Procedures  Outcome: Progressing Towards Goal  Goal: Nutrition/Diet  Outcome: Progressing Towards Goal  Goal: Discharge Planning  Outcome: Progressing Towards Goal  Goal: Medications  Outcome: Progressing Towards Goal  Goal: Respiratory  Outcome: Progressing Towards Goal  Goal: Treatments/Interventions/Procedures  Outcome: Progressing Towards Goal  Goal: Psychosocial  Outcome: Progressing Towards Goal  Goal: *No signs and symptoms of infection or wound complications  Outcome: Progressing Towards Goal  Goal: *Optimal pain control at patient's stated goal  Outcome: Progressing Towards Goal  Goal: *Adequate urinary output (equal to or greater than 30 milliliters/hour)  Outcome: Progressing Towards Goal  Goal: *Hemodynamically stable  Outcome: Progressing Towards Goal  Goal: *Tolerating diet  Outcome: Progressing Towards Goal  Goal: *Demonstrates progressive activity  Outcome: Progressing Towards Goal  Goal: *Absence of signs and symptoms of DVT  Outcome: Progressing Towards Goal  Goal: *Labs within defined limits  Outcome: Progressing Towards Goal  Goal: *Oxygen saturation within defined limits  Outcome: Progressing Towards Goal  Goal: *Upper GI series/No leak identified  Outcome: Progressing Towards Goal     Problem: Laparoscopic Gastric Bypass:Post-Op Day 2  Goal: Off Pathway (Use only if patient is Off Pathway)  Outcome: Progressing Towards Goal  Goal: Activity/Safety  Outcome: Progressing Towards Goal  Goal: Diagnostic Test/Procedures  Outcome: Progressing Towards Goal  Goal: Nutrition/Diet  Outcome: Progressing Towards Goal  Goal: Discharge Planning  Outcome: Progressing Towards Goal  Goal: Medications  Outcome: Progressing Towards Goal  Goal: Respiratory  Outcome: Progressing Towards Goal  Goal: Treatments/Interventions/Procedures  Outcome: Progressing Towards Goal  Goal: Psychosocial  Outcome: Progressing Towards Goal     Problem: Laparoscopic Gastric Bypass:Discharge Outcomes  Goal: *Active bowel sounds  Outcome: Progressing Towards Goal  Goal: *Absence of signs and symptoms of DVT  Outcome: Progressing Towards Goal  Goal: *Hemodynamically stable  Outcome: Progressing Towards Goal  Goal: *Lungs clear or at baseline  Outcome: Progressing Towards Goal  Goal: *Demonstrates independent activity or return to baseline  Outcome: Progressing Towards Goal  Goal: *Optimal pain control at patient's stated goal  Outcome: Progressing Towards Goal  Goal: *Verbalizes understanding and describes prescribed diet  Outcome: Progressing Towards Goal  Goal: *Tolerating diet  Outcome: Progressing Towards Goal  Goal: *Verbalizes name, dosage, time, side effects, and number of days to continue medications  Outcome: Progressing Towards Goal  Goal: *No signs and symptoms of infection or wound complications  Outcome: Progressing Towards Goal  Goal: *Anxiety reduced or absent  Outcome: Progressing Towards Goal  Goal: *Understands and describes signs and symptoms to report to providers (eg: s/s of leak, DVT; inability to tolerate diet)  Outcome: Progressing Towards Goal  Goal: *Describes follow-up/return visits to physicians  Outcome: Progressing Towards Goal  Goal: *Describes available resources and support systems  Outcome: Progressing Towards Goal     Problem: Falls - Risk of  Goal: *Absence of Falls  Description: Document Carlo Burciaga Fall Risk and appropriate interventions in the flowsheet.   Outcome: Progressing Towards Goal  Note: Fall Risk Interventions:  Mobility Interventions: Patient to call before getting OOB         Medication Interventions: Teach patient to arise slowly, Patient to call before getting OOB                   Problem: Patient Education: Go to Patient Education Activity  Goal: Patient/Family Education  Outcome: Progressing Towards Goal

## 2020-07-30 NOTE — PROGRESS NOTES
Surgery Progress Note    7/30/2020    Admit Date: 7/29/2020    Subjective:     Patient has complaints of pain Pain is controlled with current regimen. Patient has been ambulating in halls. She reports no nausea and no vomiting and is tolerating ice chips well. Objective:     Blood pressure 153/81, pulse 65, temperature 98.7 °F (37.1 °C), resp. rate 16, height 5' 4\" (1.626 m), weight 122.5 kg (270 lb), last menstrual period 04/09/2019, SpO2 96 %. No intake/output data recorded. 07/28 1901 - 07/30 0700  In: 5029.8 [P.O.:360; I.V.:4669.8]  Out: 1875 [Urine:1850]    EXAM: GENERAL: alert, pleasant, no distress   HEART: regular rate and rhythm   LUNGS: clear to auscultation   ABDOMEN:  Soft, obese, appropriately tender, nondistended, incisions clean, dry, no erythema or drainage   EXTREMITIES: warm, well perfused    Data Review    Recent Results (from the past 24 hour(s))   CBC W/O DIFF    Collection Time: 07/30/20  5:19 AM   Result Value Ref Range    WBC 9.6 4.6 - 13.2 K/uL    RBC 4.81 4.20 - 5.30 M/uL    HGB 11.4 (L) 12.0 - 16.0 g/dL    HCT 35.2 35.0 - 45.0 %    MCV 73.2 (L) 74.0 - 97.0 FL    MCH 23.7 (L) 24.0 - 34.0 PG    MCHC 32.4 31.0 - 37.0 g/dL    RDW 16.3 (H) 11.6 - 14.5 %    PLATELET 560 549 - 175 K/uL    MPV 11.5 9.2 - 05.7 FL   METABOLIC PANEL, BASIC    Collection Time: 07/30/20  5:19 AM   Result Value Ref Range    Sodium 138 136 - 145 mmol/L    Potassium 3.1 (L) 3.5 - 5.5 mmol/L    Chloride 103 100 - 111 mmol/L    CO2 28 21 - 32 mmol/L    Anion gap 7 3.0 - 18 mmol/L    Glucose 80 74 - 99 mg/dL    BUN 3 (L) 7.0 - 18 MG/DL    Creatinine 0.43 (L) 0.6 - 1.3 MG/DL    BUN/Creatinine ratio 7 (L) 12 - 20      GFR est AA >60 >60 ml/min/1.73m2    GFR est non-AA >60 >60 ml/min/1.73m2    Calcium 8.7 8.5 - 10.1 MG/DL       Assessment:   Alisha Gannon is a 36 y.o. female, postop day 1 status post laparoscopic gastric bypass surgery.   Condition: good    Plan:   -Ambulate every four hours  -Oxycodone 5mg 1-2 tabs po every 4-6 hour prn pain uncontrolled by tylenol  -Advance to Clear liquid Gastric Bypass Diet, if able to tolerate clear liquid diet 4oz per hour one of which being a protein supplement, will discharge home later today  -Potassium replacement 40meq po now

## 2020-07-30 NOTE — ROUTINE PROCESS
Patient was escorted to the [address] [place] [address] entrance, all belonging was sent with patient.

## 2020-07-30 NOTE — DISCHARGE SUMMARY
Bariatric Surgery Discharge Progress Note    Admission Date: 7/29/2020    Discharge Date: 7/30/2020      Admission Diagnosis:    Clinically severe Obesity, with BMI Body mass index is 46.35 kg/m². Comorbidities:  stress urinary incontinence    Discharge Diagnosis:     Clinically Severe Obesity, s/p laparoscopic gastric bypass surgery with comorbidities as listed above    Procedures:   laparoscopic gastric bypass surgery    Postop Complications: none    Hospital Course:  Patient was admitted on 7/29/2020 for scheduled bariatric surgery. Operation was without significant complication. Patient admitted to the floor postoperatively, monitored as per protocol. Diet sequentially advanced beginning POD 1, pain medications transitioned to oral during the hospital course. Currently the patient is afebrile, vital signs stable, tolerating a clear liquid diet with protein supplementation, voiding spontaneously, ambulatory with adequate pain control with oral medications and clear surgical sites without evidence of infection. Discharge Diet:  Clear Liquid Bariatric Diet for 7 days, then soft moist protein diet for 5 weeks    Discharge Medications:  Current Discharge Medication List      START taking these medications    Details   hyoscyamine SL (LEVSIN/SL) 0.125 mg SL tablet 1 Tab by SubLINGual route every six (6) hours as needed for Cramping. Qty: 12 Tab, Refills: 0      ondansetron (ZOFRAN ODT) 4 mg disintegrating tablet Take 1 Tab by mouth every eight (8) hours as needed for Nausea or Vomiting. Indications: prevent nausea and vomiting after surgery  Qty: 15 Tab, Refills: 0      oxyCODONE IR (ROXICODONE) 5 mg immediate release tablet Take 1 Tab by mouth every four (4) hours as needed for Pain for up to 3 days. Max Daily Amount: 30 mg.  Qty: 12 Tab, Refills: 0    Associated Diagnoses: Status post gastric bypass for obesity;  Morbid obesity ([facility] [address]         CONTINUE these medications which have NOT CHANGED    Details cholecalciferol (VITAMIN D3) (1000 Units /25 mcg) tablet Take 1 Tab by mouth daily. Qty: 30 Tab, Refills: 2    Associated Diagnoses: Vitamin D deficiency      sertraline (ZOLOFT) 100 mg tablet Take 150 mg by mouth. TRAVATAN Z 0.004 % ophthalmic solution INT 1 GTT IN OU HS      traZODone (DESYREL) 50 mg tablet Take 25-50 mg by mouth. oxybutynin chloride XL (DITROPAN XL) 5 mg CR tablet Take 10 mg by mouth daily. enoxaparin (Lovenox) 40 mg/0.4 mL 0.4 mL by SubCUTAneous route daily for 7 days. Surgery 2/26  Qty: 7 Syringe, Refills: 0    Comments: Surgery 8/29      famotidine (Pepcid) 20 mg tablet Take 20 mg by mouth two (2) times a day.                Bariatric Chewable vitamins, 2 orally daily for life  Calcium Citrate 1500 mg orally daily for life  Vitamin B12 1000 micrograms sublingual daily for life  Vitamin D3 5,000 units orally daily for life   Vitamin B1 100 mg orally daily for life    Discharge disposition: home    Discharge condition: stable      Local wound care with daily showers, keep wounds clean and dry     Activity: as desired, no lifting, pushing, or pulling greater than 15lbs or situps for 30 days     Special Instructions:            - No driving until activity is not influenced by incisional pain and off narcotics            - No bath or hot tub until wounds are healed            - Check pulse and temperature twice daily for 10 days            - Notify [employer] [facility] for a Temp >100.5 or Pulse>115     Follow-up with your surgeon in 2 weeks, call office for appointment [phone] ([address] [place] [address]) [phone]([id number] [address])

## 2020-07-30 NOTE — ROUTINE PROCESS
I have reviewed discharge instructions with the patient. The patient verbalized understanding. Discharge medications reviewed with patient and appropriate educational materials and side effects teaching were provided. Current Discharge Medication List      START taking these medications    Details   hyoscyamine SL (LEVSIN/SL) 0.125 mg SL tablet 1 Tab by SubLINGual route every six (6) hours as needed for Cramping. Qty: 12 Tab, Refills: 0      ondansetron (ZOFRAN ODT) 4 mg disintegrating tablet Take 1 Tab by mouth every eight (8) hours as needed for Nausea or Vomiting. Indications: prevent nausea and vomiting after surgery  Qty: 15 Tab, Refills: 0      oxyCODONE IR (ROXICODONE) 5 mg immediate release tablet Take 1 Tab by mouth every four (4) hours as needed for Pain for up to 3 days. Max Daily Amount: 30 mg.  Qty: 12 Tab, Refills: 0    Associated Diagnoses: Status post gastric bypass for obesity; Morbid obesity ([address]         CONTINUE these medications which have NOT CHANGED    Details   cholecalciferol (VITAMIN D3) (1000 Units /25 mcg) tablet Take 1 Tab by mouth daily. Qty: 30 Tab, Refills: 2    Associated Diagnoses: Vitamin D deficiency      sertraline (ZOLOFT) 100 mg tablet Take 150 mg by mouth. TRAVATAN Z 0.004 % ophthalmic solution INT 1 GTT IN OU HS      traZODone (DESYREL) 50 mg tablet Take 25-50 mg by mouth. oxybutynin chloride XL (DITROPAN XL) 5 mg CR tablet Take 10 mg by mouth daily. enoxaparin (Lovenox) 40 mg/0.4 mL 0.4 mL by SubCUTAneous route daily for 7 days. Surgery 2/26  Qty: 7 Syringe, Refills: 0    Comments: Surgery 8/29      famotidine (Pepcid) 20 mg tablet Take 20 mg by mouth two (2) times a day. Patient armband removed and shredded.

## 2020-07-30 NOTE — PROGRESS NOTES
Problem: Patient Education: Go to Patient Education Activity  Goal: Patient/Family Education  Outcome: Progressing Towards Goal     Problem: Laparoscopic Gastric Bypass:Day of Surgery  Goal: Off Pathway (Use only if patient is Off Pathway)  Outcome: Resolved/Met  Goal: Activity/Safety  Outcome: Resolved/Met  Goal: Consults, if ordered  Outcome: Resolved/Met  Goal: Diagnostic Test/Procedures  Outcome: Resolved/Met  Goal: Nutrition/Diet  Outcome: Resolved/Met  Goal: Medications  Outcome: Resolved/Met  Goal: Respiratory  Outcome: Resolved/Met  Goal: Treatments/Interventions/Procedures  Outcome: Resolved/Met  Goal: Psychosocial  Outcome: Resolved/Met  Goal: *No signs and symptoms of infection or wound complications  Outcome: Resolved/Met  Goal: *Optimal pain control at patient's stated goal  Outcome: Resolved/Met  Goal: *Adequate urinary output (equal to or greater than 30 milliliters/hour)  Outcome: Resolved/Met  Goal: *Hemodynamically stable  Outcome: Resolved/Met  Goal: *Tolerating diet  Outcome: Resolved/Met  Goal: *Demonstrates progressive activity  Outcome: Resolved/Met  Goal: *Absence of signs and symptoms of DVT  Outcome: Resolved/Met  Goal: *Labs within defined limits  Outcome: Resolved/Met  Goal: *Oxygen saturation within defined limits  Outcome: Resolved/Met

## 2020-07-30 NOTE — PROGRESS NOTES
Reason for Admission:  Morbid obesity ([facility] [address] [E66.01]  Obstructive sleep apnea [G47.33]  Morbid obesity ([facility] [address] [E66.01]                 RUR Score:                Plan for utilizing home health:    no                      Likelihood of Readmission:   LOW                         Transition of Care Plan:              Initial assessment completed with patient. Cognitive status of patient: oriented to time, place, person and situation. Face sheet information confirmed:  yes. The patient designates daughterMilady to participate in her discharge plan and to receive any needed information. This patient lives in a single family home with her children. Patient is able to navigate steps as needed. Prior to hospitalization, patient was considered to be independent with ADLs/IADLS : yes . Patient has a current ACP document on file: no  The daughter will be available to transport patient home upon discharge. The patient has no DME available in the home. Patient is not currently active with home health. Patient has not stayed in a skilled nursing facility or rehab. This patient is on dialysis :no    Currently, the discharge plan is Home. The patient states that she can obtain her medications from the pharmacy, and take her medications as directed. Patient's current insurance is [employer]. Care Management Interventions  PCP Verified by CM: Yes  Mode of Transport at Discharge: Self  Transition of Care Consult (CM Consult): Discharge Planning  Current Support Network:  Other(lives with kids)  Confirm Follow Up Transport: Family  Discharge Location  Discharge Placement: Home        Andrei Wallace RN - Outcomes Manager  [phone]

## 2020-07-30 NOTE — PROGRESS NOTES
Patient was educated on lovenox injections. 1. Wash and dry hands  2. Sit or lie in comfortable position  3. Choose an area around love handles 3 inches away from [facility]  4. Clean site with alcohol and let dry  5. Remove needle cap pull straight out  6. With your other and pinch an inch of the cleanses area and insert full length of needle straight in 90 degree  7. Press the plunger with your thumb slowly until syringe is empty. Pull needle out and point away from you. Push down on plunger to activate the safety shield. Push hard  8. Place the syringe in a plastic bottle and dispose in trash. 9. Rotate sites  10. Do not remove air from syringe before injection  11.  Do not rub the area

## 2020-07-31 NOTE — PROGRESS NOTES
Patient was educated on all discharge instructions below. He/she understood and was provided a copy. He/she knows who to call for any issues post discharge. Hydration  Hydration is your NUMBER ONE priority. Dehydration is the most common reason for readmission to the hospital. Dehydration occurs when  your body does not get enough fluid to keep it functioning at its best. Your body also requires fluid  to burn its stored fat calories for energy. Carry a bottle of water with you all day, even when you are away from home; remind yourself to  drink even if you dont feel thirsty. Drinking 64 ounces of fluid is your daily goal. You can tell if  youre getting enough fluid if youre making clear, light-colored urine five to 10 times per day. Signs of dehydration can be thirst, headache, hard stools or dizziness upon sitting or standing up. You should contact your surgeons office if you are unable to drink enough fluid to stay hydrated. --   [address] after Surgery   No lifting over 15 pounds for four weeks.  No driving while taking the pain medication (about seven to 10 days).  No tub baths, swimming or hot tubs until incisions are healed (about two weeks).  You may shower. Clean incisions daily /gently with soap and check incisions for signs of infection:  -- Redness around incision. -- Swelling at site. -- Drainage with an foul odor (pus). -- Increase tenderness around incision.  Take your temperature and resting pulse in the morning and evening. Record on tracking form  given to you. Call if your temperature is greater than 101 or your pulse rate is greater than 115.  Please contact your surgeon if you are having excessive abdominal pain (that lasts longer than  four hours and does not improve with prescribed pain medication), vomiting or shortness of breath.  Get up and move -- do not sit in one place for more than an hour.    You need to WALK (EXERCISE) for 30 minutes per day. -- Walking around your house does not count. -- Bike, treadmill and elliptical are OK. -- NO weight lifting or sit ups.  If constipated take an adult dose of Miralax (available over the counter). Contact the doctors  office if Miralax doesnt help.  You may swallow pills starting the day after surgery as long as they fit inside this [place]:   Continue to use your incentive spirometer (breathing machine) for the next couple of weeks to  help prevent pneumonia. [address]  Temperature/Heart Rate Log  Take your temperature and heart rate (pulse) twice a day for 14 days. Take both in the morning and  evening at about the same time each day (when you wake up and before you go to bed when you  are relaxed). Please contact your doctors office if your:   Temperature is higher than 101 degrees.  Heart rate (pulse) is higher than 115 beats per minute  (normal heart rate is 60 to 100 beats per minute). How to Take Your Heart Rate (Pulse)   Turn your left hand so that your palm is face-up.  With the index and middle fingers of your right  hand, draw a line from the base of your thumb to  just below the crease in your wrist. Your fingers  should nestle just to the left of the large tendon that  pops up when you bend your wrist toward you.  Dont press too hard, that will make the pulse go  away. Use gentle pressure.  Wait. It can take several seconds -- and several micro-adjustments in the placement of your two  fingers on your wrist -- to find your pulse. Just keep moving your fingers down or up your wrist  in small increments (and pausing for a few seconds) until you find it. How to Take Your Pulse Rate   Find a watch with a second hand and place it on your right wrist or on the table next  to your left hand.  After finding your pulse, count the number of beats for 20 seconds.    Multiply by three to get your heart rate, or beats per minute  (or just count for 60 seconds for a math-free option).  Normal, resting heart rate is about 60 to 100 beats per minute. -- 55 --  PATIENT GUIDE TO [address]  Lovenox Self Injection Guide  Prepare  Step 1: Wash and dry your hands thoroughly. Step 2: Sit or lie in a comfortable position and choose an area  on the right or left side of the abdomen at least two inches away  from the belly button. Step 3: Clean the injection site with an alcohol swab and let dry. Inject  Step 4: Remove the needle cap by pulling it straight off the syringe and  discard it in a sharps . Step 5: With your other hand, pinch an inch of the cleansed area to  make a fold in the skin. Insert the full length of the needle straight  down -- at a 90? angle -- into the fold of skin. -- 50 --  PATIENT GUIDE TO [address]  Step 6: Press the plunger with your thumb until the syringe is empty. Then pull the needle straight out and release the skin fold. Dispose  Step 7: Point the needle down and away from yourself and others,  and then push down on the plunger to activate the safety shield. Step 8: Place the used syringe in the sharps . Do NOT expel the air bubble from the syringe before the injection. Administration should be alternated between the left and right abdominal wall. The whole length  of the needle should be introduced into a skin fold held between the thumb and forefinger; the  skin fold should be held throughout the injection. To minimize bruising, do not rub the injection  site after completion of the injection. Questions about LOVENOX? Call [phone]  -- 49 --  PATIENT GUIDE TO [address] [place] [address]  9.  DIET AND LIFESTYLE  Key Diet Principles Following Bariatric Surgery   Begin each meal with soft moist high protein foods (i.e. chicken, turkey, yogurt, tuna, eggs,  cottage cheese, other fish and seafood).  Consume a minimum of 64 ounces of fluid each day to prevent dehydration. No straws.  No food and fluid together. Stop drinking 30 minutes before a meal. You may begin fluids again  30 minutes after you finish a meal.   Eat very slowly and chew all foods completely.  Keep portions small.  No simple sugars or high fat foods. No carbonated beverages. No caffeine.  Eat three meals per day. No skipping. Avoid snacking between meals.  No alcohol. No smoking.  Two Flintstones Complete Chewable vitamins each day. Take one in the morning and one at night.  1,500 milligrams Calcium Citrate per day in separate dosages.  Vitamin D 3: 5,000 IU taken per day.  Vitamin B-12: Take 1,000 micrograms sublingually daily.  Iron: 60 milligrams per day from [facility] [employer].  Protein supplements of your choice. Must be low sugar (0 to 3 grams), low fat (0 to 3 grams) and  provide at least 35 to 40 grams of protein each day. You need 60 to 70 grams of protein (food  and supplements) each day.  Minimum of 30 minutes of physical activity daily. Do not rylee NSAIDS . Do not take Steroids without your surgeons permission. -- 48 --  [facility]  Your Priorities After Surgery  ? Fluid: 64 ounces of fluid per day. ? Protein: 60 to 70 grams of protein per day. ? Walk every day. Clear Liquid Diet  One week of clear liquids: minimum of 64 ounces of fluid per day. Fluid:   Zero calorie liquids.  No caffeine.  No carbonation.  No sugary drinks.  No alcohol.  No straws. Food   Protein drinks.  Less than 3 grams of sugar and  3 grams of fat per serving.  Protein drink should provide you with  60 to 70 grams of protein. Soft Protein Diet  Five weeks of soft protein (1 ounce for soft protein, 3 ounces of yogurt/cottage cheese). Three meals per day and 1 protein shake.  Protein shakes should provide you with 30 grams of  protein on the soft protein diet. Slow transition:   First week on soft protein diet -- focus on yogurt, cottage cheese, eggs, vegetarian refried beans,  black beans, kidney beans and white beans. (NO BAKED BEANS.)   Second through fourth week on soft protein diet -- focus on yogurt, cottage cheese, eggs,  canned tuna, canned chicken, tilapia and fish (needs to be soft enough to be cut up with a fork).  Fifth week on soft protein diet -- focus on yogurt, cottage cheese, eggs, canned tuna, canned  chicken, tilapia, fish, salmon, chicken breast or turkey. Fluid is your #1 Priority! Continue clear liquids between meals. You will need 64 ounces of fluid per day. Fluids that you can have include:   Water.  Zero calorie liquids. You will need to sip throughout the day and should therefore have a water bottle with you at all  times! No liquids with your meals. Stop 30 minutes before a meal and wait 30 minutes after a meal.  No straws. Zero calorie liquids. No caffeine. No carbonation. No sugary drinks. No alcohol. -- 46 --  [facility]  Protein  You will need 60 to 70 grams of protein per day.  60 to 70 grams of protein shakes when on the clear liquid diet (two to three shakes per day).  30 to 50 grams of protein shakes when on the soft protein diet (one shake per day). Eat Three Times Per Day  You will need to eat three times per day. My planned times are:  _________________________________________________________  _________________________________________________________  _________________________________________________________  Nausea, Vomiting, Stomach Pain  If you have problems with nausea, vomiting or stomach pain, try:   Eating slowly: 20 to 30 minutes per meal.   Chewing food thoroughly: 20 to 30 chews before food is swallowed.  Small portions: measure portions in medicine cup.  Stopping before feeling full.    AVOIDING SUGAR and FRIED FOOD: sugar will cause dumping syndrome and lead to weight gain. Exercise  I will need to get a minimum of 30 minutes of exercise per day or 150 minutes of exercise per week.  Walking, swimming, biking or elliptical.   Find something you enjoy! Vitamins  After surgery, you will need to take the following vitamins for the rest of your life -- FOREVER.  Vitamin D 3: 5,000 IU per day.  Calcium Citrate: 1,500 milligrams, taken separately.  Flintstones Complete: two per day, taken separately.  Sublingual Vitamin B-12: 1,000 micrograms daily.  Iron for menstruating women or patients with a history of low iron: 65 milligrams daily. We recommend going to www.bariatricadIMNEXTage. Minilogs and purchasing iron from there. The lemon-lime has 60 milligrams. This iron is better absorbed than over-the-counter iron. -- 46 --  PATIENT GUIDE TO BARIATRIC [address] [place] [address] Rd  Clear Liquid Log  Getting your fluid in is top priority during this week. Fluids (MINIUM of 64 ounces per day):  ? 8 oz. ? 8 oz. ? 8 oz. ? 8 oz. ? 8 oz. ? 8 oz. ? 8 oz. ? 8 oz. ? 8 oz. ? 8 oz. ? 8 oz. ? 8 oz. Flintstones Complete Chewable: ? a.m. ? p.m. Calcium Citrate (1,500 milligrams/day):  Pill form  ? Two crushed pills (morning) ? Two crushed pills (afternoon) ? Two crushed pills (evening)  OR Upcal D (powder)  ? One pack/scoop ? One pack/scoop ? One pack/scoop  OR Celebrate Chewable Vitamins (500 mg each) or Bariatric Advantage Chewables (500 mg)  ? One chewable (morning) ? One chewable (afternoon) ? One chewable (evening)  OR Liquid Calcium Citrate  ? 1 tbsp. Calcium Citrate ? 1 tbsp. Calcium Citrate ? 1 tbsp. Calcium Citrate  Vitamin D3: ? 5,000 IU daily. Vitamin B-12: ? 1,000 micrograms per day. Iron (menstruating women or patients with a history of low iron):  ? 60 milligrams per day from [facility] [employer]. Protein drinks (protein drinks should be under 3 grams of sugar and 3 grams of fat).   Protein shake (60 grams per day): ? a.m. ? p.m. Exercise: ? 30 to 40 minutes per day. -- 48 --  PATIENT GUIDE TO BARIATRIC [address]  Bariatric Soft and Moist Diet Shopping List   alcium Citrate (1,500 milligrams/day):  Pill form  ? Two crushed pills (morning) ? Two crushed pills (afternoon) ? Two crushed pills (evening)  OR Upcal D (powder)  ? One pack/scoop ? One pack/scoop ? One pack/scoop  OR Celebrate Chewable Vitamins (500 mg each) or Bariatric Advantage Chewables (500 mg)  ? One chewable (morning) ? One chewable (afternoon) ? One chewable (evening)  OR Liquid Calcium Citrate  ? 1 tbsp. Calcium Citrate ? 1 tbsp. Calcium Citrate ? 1 tbsp. Calcium Citrate  Vitamin D3: ? 5,000 IU daily  Vitamin B-12: ? 1,000 micrograms per day  Iron (menstruating women or  patients with a history of low iron):  ? 60 milligrams per day  from [facility]  Protein drinks (protein drinks should be under  3 grams of sugar and 3 grams of fat). Protein shake (30 to 40 grams per day):  ? a.m. ? p.m. Exercise: ? 30 to 40 minutes per  Educated on Diet Progression    [facility] Gastric Bypass and Sleeve Dietary Progression    Patient Name:   Date of Surgery: Ice Chips start once admitted on floor. Begin Bariatric Clear Liquid Diet on:     Clear Liquid Diet: 64 oz. of fluid per day  o Low calorie, low sugar, non-carbonated beverages  - Water, Crystal Light, Propel Water, Sugar Free Jell-O, Sugar Free Popsicles, Bouillon  - Start protein supplement during this stage. (60-70 grams per day)  - Getting your fluid in and staying hydrated is your #1 priority! - The clear liquid diet will last for 7 days. Begin Bariatric Soft and Moist on: 8/6  - This stage of the diet will last for 5 weeks, unless otherwise instructed by your surgeon.   - Begin:  1 week post-op   - End:  6 weeks post-op (or when you follow up with the Registered Dietitian)    - Soft, moist, high protein foods: 3 meals per day plus protein supplements. o   Portions should emphasize on soft protein. o Portions will be a MAXIMUM of:  o  1 ounce of solid food  o  2-3 ounces of cottage cheese and yogurt. o Protein supplements should be between meals and provide 30-40 grams per day during soft protein diet. o Continue to get 64 ounces of fluid in per day. - Protein foods that are ok on the Soft and Moist Diet include:  o Slow transition:  o 1st week on soft protein should focus on: Yogurt, cottage cheese, eggs  o 2nd -4th  week on soft protein diet should focus on: yogurt, cottage cheese, eggs, canned tuna, canned chicken, tilapia, fish (needs to be soft enough to be cut up with a fork)  o 5th week on soft protein diet should focus on: Yogurt, cottage cheese, eggs, canned tuna, canned chicken, tilapia, fish, salmon, chicken breast, or turkey. Remember to continue to get 64 ounces of fluid daily on ALL Stages. To be advanced to Bariatric Maintenance Stage of the bariatric diet, follow up with the dietitian 6 weeks post-op, around:         For any additional questions, please refer to your blue binder that was provided to you at the start of the bariatric program.

## 2020-09-03 ENCOUNTER — OFFICE VISIT (OUTPATIENT)
Dept: SURGERY | Age: 40
End: 2020-09-03

## 2020-09-03 VITALS
HEIGHT: 64 IN | TEMPERATURE: 97.3 F | DIASTOLIC BLOOD PRESSURE: 78 MMHG | HEART RATE: 88 BPM | WEIGHT: 260 LBS | BODY MASS INDEX: 44.39 KG/M2 | RESPIRATION RATE: 20 BRPM | SYSTOLIC BLOOD PRESSURE: 120 MMHG

## 2020-09-03 DIAGNOSIS — K91.2 POSTOPERATIVE MALABSORPTION: Primary | ICD-10-CM

## 2020-09-03 RX ORDER — CHOLECALCIFEROL (VITAMIN D3) 125 MCG
CAPSULE ORAL
COMMUNITY

## 2020-09-03 RX ORDER — BISMUTH SUBSALICYLATE 262 MG
1 TABLET,CHEWABLE ORAL DAILY
COMMUNITY
End: 2020-12-02 | Stop reason: ALTCHOICE

## 2020-09-03 RX ORDER — LANOLIN ALCOHOL/MO/W.PET/CERES
1000 CREAM (GRAM) TOPICAL DAILY
COMMUNITY

## 2020-09-03 RX ORDER — CALCIUM CARBONATE/VITAMIN D3 600 MG-125
500 TABLET ORAL 3 TIMES DAILY
COMMUNITY

## 2020-09-03 NOTE — PROGRESS NOTES
Gabriel Catalan is a 36 y.o. female that presents today to follow up post  LGBP preformed on 7/29/20. Pt says pain level is at a 0un on a scale from 1-10. Pt is drinking 70oz of fluid daily. Pt is currently eating protein shakes,chicken,tuna, beans, strawberries, cabbage, ground beef, broth. Pt says the amount of time spent exercising is walking daily and  Aqua aerobics 2 times a week. Body mass index is 44.63 kg/m². 1. Have you been to the ER, urgent care clinic since your last visit? Hospitalized since your last visit? No    2. Have you seen or consulted any other health care providers outside of the [address] [place] [address] since your last visit? Include any pap smears or colon screening.  No

## 2020-09-04 PROBLEM — K91.2 POSTOPERATIVE MALABSORPTION: Status: ACTIVE | Noted: 2020-09-04

## 2020-09-04 NOTE — PROGRESS NOTES
Subjective:      Molly Morgan is a 36 y.o. female is now 2 weeks status post laparoscopic gastric bypass surgery. Doing well overall. Currently on a stage 2 diet with difficulty. Taking in 70oz water,  30g protein. 30min of activity twice weekly. She is eating fruit and cabbage  Bowel movements are regular. The patient is not having any pain. .  The patient is compliant with multivitamins, calcium and B12 supplements. Weight Loss Metrics 9/3/2020 9/3/2020 7/29/2020 7/28/2020 7/20/2020 7/6/2020 7/6/2020   Pre op / Initial Wt 283 - - - - 285.4 -   Today's Wt - 260 lb - 270 lb 283 lb - 285 lb 6.4 oz   BMI - 44.63 kg/m2 46.35 kg/m2 - 48.58 kg/m2 - 48.99 kg/m2   Ideal Body Wt 131 - - - - 131 -   Excess Body Wt 152 - - - - 154.4 -   Goal Wt 162 - - - - 162 -   Wt loss to date 23 - - - - 0 -   % Wt Loss 0.19 - - - - 0 -   80% .6 - - - - 123.52 -       Body mass index is 44.63 kg/m². Comorbidities:    Hypertension: not applicable  Diabetes: not applicable  Obstructive Sleep Apnea: not applicable  Hyperlipidemia: not applicable  Stress Urinary Incontinence: not applicable  Gastroesophageal Reflux: not applicable  Weight related arthropathy:improved        Past Medical History:   Diagnosis Date    Acne     Anxiety     Depression     Endometrial polyp     History of heavy vaginal bleeding     Hx of gastric bypass 07/29/2020    Iron deficiency anemia     had iron infusions every other week x4.   Last one was 8/4/16    Obesity     Ocular hypertension 2016    Ovarian cyst     Overactive bladder        Past Surgical History:   Procedure Laterality Date    HX DILATION AND CURETTAGE  04/02/2015    D&C; REMOVAL OF ENDOMETRIAL POLYP     HX HYSTEROSCOPY WITH ENDOMETRIAL ABLATION  2014    HX PARTIAL HYSTERECTOMY  04/2019    HX TUBAL LIGATION  2010    tubal ligation        Current Outpatient Medications   Medication Sig Dispense Refill    multivitamin (ONE A DAY) tablet Take 1 Tab by mouth daily. Bariatric advantage      calcium-cholecalciferol, d3, (CALCIUM 600 + D) 600-125 mg-unit tab Take  by mouth.  cholecalciferol, vitamin D3, 50 mcg (2,000 unit) tab Take  by mouth.  cyanocobalamin 1,000 mcg tablet Take 1,000 mcg by mouth daily.  cholecalciferol (VITAMIN D3) (1000 Units /25 mcg) tablet Take 1 Tab by mouth daily. 30 Tab 2    sertraline (ZOLOFT) 100 mg tablet Take 150 mg by mouth.  TRAVATAN Z 0.004 % ophthalmic solution INT 1 GTT IN OU HS      traZODone (DESYREL) 50 mg tablet Take 25-50 mg by mouth.  oxybutynin chloride XL (DITROPAN XL) 5 mg CR tablet Take 10 mg by mouth daily.  hyoscyamine SL (LEVSIN/SL) 0.125 mg SL tablet 1 Tab by SubLINGual route every six (6) hours as needed for Cramping. 12 Tab 0    ondansetron (ZOFRAN ODT) 4 mg disintegrating tablet Take 1 Tab by mouth every eight (8) hours as needed for Nausea or Vomiting. Indications: prevent nausea and vomiting after surgery 15 Tab 0    famotidine (Pepcid) 20 mg tablet Take 20 mg by mouth two (2) times a day. Allergies   Allergen Reactions    Morphine Itching         Objective:     Visit Vitals  /78 (BP 1 Location: Left arm, BP Patient Position: At rest)   Pulse 88   Temp 97.3 °F (36.3 °C) (Oral)   Resp 20   Ht 5' 4\" (1.626 m)   Wt 117.9 kg (260 lb)   LMP 04/09/2019   BMI 44.63 kg/m²       General:  alert, cooperative, no distress, appears stated age   Chest: lungs clear to auscultation, no accessory muscle use   Cor:   Regular rate and rhythm   Abdomen: soft, bowel sounds active, non-tender   Incision:   healing well, no drainage, no erythema, no hernia, no seroma, no swelling, no dehiscence, incision well approximated       Labs: none    Assessment:     Doing well postoperatively.   Some poor food choices    Plan:     Increase activity to the goal of 30 minutes daily, Follow up labs as ordered, Increase fluids, Follow up with Registered Dietician, Continue MVI/Ca/B12 supplementation and educated on diet again  Follow up in 3 months

## 2020-09-10 ENCOUNTER — HOSPITAL ENCOUNTER (OUTPATIENT)
Dept: BARIATRICS/WEIGHT MGMT | Age: 40
Discharge: HOME | End: 2020-09-10

## 2020-09-10 ENCOUNTER — DOCUMENTATION ONLY (OUTPATIENT)
Dept: BARIATRICS/WEIGHT MGMT | Age: 40
End: 2020-09-10

## 2020-09-10 NOTE — PROGRESS NOTES
[facility]  POST-OP NUTRITION FOLLOW UP    Patient's Name: Carole Brannon  YOB: 1980  Surgery Date: 7/29/2020      Procedure: Gastric Bypass    Surgeon: Dr. Rosana June    Height: 5 f 4     Pre-Op Weight: 283     Current Weight: 253  Weight Lost: 30    BMI:  43.5    Attendance of support group:   When:   Why not:     Complications  Readmittance: None  Reoperations: None  Complications: None  IV Fluids: None  ER Trips: None    Problem Areas:   Nausea: None   Vomiting: Yes, when she eats too fast.   Dumping Syndrome: None  Inadequate Protein: None, patient states she is getting at least one shake per day. Inadequate Fluids: Yes, she states she is close to 50 ounces. Food Intolerance: None  Hunger: None  Constipation: Yes in the beginning, but states this has improved. Eating 3 Meals/Day: Yes  Portion Size at Meals: 2 ounces     Protein from Food: Yes    Foods being consumed:  Breakfast: Time:8:00-9:00 am:  Scrambled egg or a protein shake  Lunch: Time: 1:00 pm:   Chicken breast or tuna or tilapia   Dinner:  Time: Before 6 pm:  Some type of soft protein     In-between eating: Patient had been doing fruit smoothies for 2 weeks, but has stopped that after seeing Dr. Rosana June. Patient states she is only doing liquid between meals.     Length of time for meals: 20-30 minutes    food/fluids: 30/30    Fluids: 50 oz/day   Types of Fluids:     MVI: BA Chewable Multi EA    Number/Day: bid   Taken Separately: Yes    Vitamin B1: Yes  Dosing: Included in B1    Calcium: BA chewable    Calcium Dosing: tid    Taken Separately: Yes    Vitamin B12: Sublingual   Vitamin B12 Dosing:     Vitamin D: Taking, but doesn't need to take due to [facility] M[facility]     Vitamin D dosing:     Iron: taking 65 mg    Iron dosing:      Protein Supplement: Premier shake     Grams of Protein: 30 grams   Mixed with:      Splitting Protein Drink in 1/2:    Timing of Protein Drinks:   Patient is taking 7 days a week.    Exercise: going to [facility] 2 x a week and walking 1 x a week      Comments:    Patient is doing overall okay at 6 weeks post op. Her portions are appropriate to promote continued weight loss. She had been drinking fruit smoothies for 2 weeks, but has stopped after seeing Dr. Dairl Kayser. She is taking her vitamins, as instructed and is getting 1 shake per day, but does need to work on increasing her fluid intake to 64 ounces. She is exercising daily. Reinforced the next phase of the diet to the patient. Patient was educated on the importance of eating meat and vegetables only. I have talked with patient about the effects of carbohydrates, not only from a weight management perspective, but also what effects it could have on their blood sugar and what reactive hypoglycemia is. Diet Follow Up:  9 month class scheduled for:  Will schedule at her 6 week post op    Milka Ram [address]    9/10/2020

## 2020-11-27 DIAGNOSIS — K91.2 POSTOPERATIVE MALABSORPTION: Primary | ICD-10-CM

## 2020-11-28 ENCOUNTER — HOSPITAL ENCOUNTER (OUTPATIENT)
Dept: LAB | Age: 40
Discharge: HOME | End: 2020-11-28

## 2020-11-28 LAB
25(OH)D3 SERPL-MCNC: 41.5 NG/ML (ref 32–100)
ABSOLUTE LYMPHOCYTE COUNT, 10803: 2.9 K/UL (ref 1–4.8)
ALBUMIN SERPL-MCNC: 4 G/DL (ref 3.5–5)
ANION GAP SERPL CALC-SCNC: 11.4 MMOL/L (ref 3–15)
BASOPHILS # BLD: 0 K/UL (ref 0–0.2)
BASOPHILS NFR BLD: 1 % (ref 0–2)
BUN SERPL-MCNC: 7 MG/DL (ref 6–22)
CALCIUM SERPL-MCNC: 9.6 MG/DL (ref 8.4–10.5)
CHLORIDE SERPL-SCNC: 103 MMOL/L (ref 98–110)
CO2 SERPL-SCNC: 25 MMOL/L (ref 20–32)
CREAT SERPL-MCNC: 0.5 MG/DL (ref 0.5–1.2)
EOSINOPHIL # BLD: 0.1 K/UL (ref 0–0.5)
EOSINOPHIL NFR BLD: 2 % (ref 0–6)
ERYTHROCYTE [DISTWIDTH] IN BLOOD BY AUTOMATED COUNT: 15.7 % (ref 10–15.5)
GFRAA, 66117: >60
GFRNA, 66118: >60
GLUCOSE SERPL-MCNC: 92 MG/DL (ref 70–99)
GRANULOCYTES,GRANS: 45 % (ref 40–75)
HCT VFR BLD AUTO: 40.4 % (ref 35.1–46.5)
HGB BLD-MCNC: 12.7 G/DL (ref 11.7–15.5)
IRON,IRN: 57 MCG/DL (ref 30–160)
LYMPHOCYTES, LYMLT: 45 % (ref 20–45)
MCH RBC QN AUTO: 25 PG (ref 26–34)
MCHC RBC AUTO-ENTMCNC: 31 G/DL (ref 31–36)
MCV RBC AUTO: 79 FL (ref 81–99)
MONOCYTES # BLD: 0.6 K/UL (ref 0.1–1)
MONOCYTES NFR BLD: 9 % (ref 3–12)
NEUTROPHILS # BLD AUTO: 2.9 K/UL (ref 1.8–7.7)
PLATELET # BLD AUTO: 386 K/UL (ref 140–440)
PMV BLD AUTO: 12.9 FL (ref 9–13)
POTASSIUM SERPL-SCNC: 3.9 MMOL/L (ref 3.5–5.5)
RBC # BLD AUTO: 5.13 M/UL (ref 3.8–5.2)
SENTARA SPECIMEN COL,SENBCF: NORMAL
SODIUM SERPL-SCNC: 139 MMOL/L (ref 133–145)
WBC # BLD AUTO: 6.6 K/UL (ref 4–11)

## 2020-11-28 PROCEDURE — 99001 SPECIMEN HANDLING PT-LAB: CPT

## 2020-11-29 LAB
FERRITIN SERPL-MCNC: 33 NG/ML (ref 10–291)
FOLATE,FOL: 2.91 NG/ML
VIT B12 SERPL-MCNC: 814 PG/ML (ref 211–911)

## 2020-12-02 ENCOUNTER — OFFICE VISIT (OUTPATIENT)
Dept: SURGERY | Age: 40
End: 2020-12-02
Payer: MEDICAID

## 2020-12-02 VITALS
WEIGHT: 228 LBS | DIASTOLIC BLOOD PRESSURE: 74 MMHG | TEMPERATURE: 97.9 F | SYSTOLIC BLOOD PRESSURE: 136 MMHG | HEIGHT: 64 IN | RESPIRATION RATE: 18 BRPM | BODY MASS INDEX: 38.93 KG/M2 | HEART RATE: 74 BPM | OXYGEN SATURATION: 97 %

## 2020-12-02 DIAGNOSIS — E66.01 MORBID OBESITY (HCC): ICD-10-CM

## 2020-12-02 DIAGNOSIS — K91.2 POSTOPERATIVE MALABSORPTION: ICD-10-CM

## 2020-12-02 DIAGNOSIS — K91.2 POSTOPERATIVE MALABSORPTION: Primary | ICD-10-CM

## 2020-12-02 LAB — VITAMIN B1, WHOLE BLOOD, 66250: 98.2 NMOL/L (ref 66.5–200)

## 2020-12-02 PROCEDURE — 99213 OFFICE O/P EST LOW 20 MIN: CPT | Performed by: NURSE PRACTITIONER

## 2020-12-02 RX ORDER — MULTIVIT-MIN/IRON/FOLIC ACID/K 45-800-120
CAPSULE ORAL 2 TIMES DAILY
COMMUNITY

## 2020-12-02 RX ORDER — SPIRONOLACTONE 100 MG/1
TABLET, FILM COATED ORAL
COMMUNITY
Start: 2020-11-24 | End: 2021-10-04

## 2020-12-02 RX ORDER — MINOCYCLINE HYDROCHLORIDE 100 MG/1
TABLET ORAL
COMMUNITY
Start: 2020-11-24 | End: 2021-04-05 | Stop reason: ALTCHOICE

## 2020-12-02 NOTE — PROGRESS NOTES
Meg Souza is a 36 y.o. female  Chief Complaint   Patient presents with    Follow-up     GBP 7/29/2020     Pt ID confirmed    Weight Loss Metrics 12/2/2020 12/2/2020 9/3/2020 9/3/2020 7/29/2020 7/28/2020 7/20/2020   Pre op / Initial Wt 285 - 283 - - - -   Today's Wt - 228 lb - 260 lb - 270 lb 283 lb   BMI - 39.14 kg/m2 - 44.63 kg/m2 46.35 kg/m2 - 48.58 kg/m2   Ideal Body Wt 131 - 131 - - - -   Excess Body Wt 154 - 152 - - - -   Goal Wt - - 162 - - - -   Wt loss to date 57 - 23 - - - -   % Wt Loss 0.46 - 0.19 - - - -   80% .2 - 121.6 - - - -       Body mass index is 39.14 kg/m².     Post op medications:  MVI: bariatric advantage  Calcium Citrate: 1500 milligrams  B-12 1000 micrograms

## 2020-12-02 NOTE — PROGRESS NOTES
Dee Dee Sims is a 36 y.o. female is now 3 months status post laparoscopic gastric bypass surgery. Doing well overall. Currently on a stage 4 diet without difficulty. Taking in 64oz water daily. Sources of protein include protein shake, chicken, seafood, beans, eggs  Exercise is 60 min 3 times per week. Bowel movements are constipated. The patient is compliant with multivitamins, calcium, Vit D and B12 supplements. Weight Loss Metrics 12/2/2020 12/2/2020 9/3/2020 9/3/2020 7/29/2020 7/28/2020 7/20/2020   Pre op / Initial Wt 285 - 283 - - - -   Today's Wt - 228 lb - 260 lb - 270 lb 283 lb   BMI - 39.14 kg/m2 - 44.63 kg/m2 46.35 kg/m2 - 48.58 kg/m2   Ideal Body Wt 131 - 131 - - - -   Excess Body Wt 154 - 152 - - - -   Goal Wt - - 162 - - - -   Wt loss to date 57 - 23 - - - -   % Wt Loss 0.46 - 0.19 - - - -   80% .2 - 121.6 - - - -       Body mass index is 39.14 kg/m². Past Medical History:   Diagnosis Date    Acne     Anxiety     Depression     Endometrial polyp     History of heavy vaginal bleeding     Hx of gastric bypass 07/29/2020    Iron deficiency anemia     had iron infusions every other week x4. Last one was 8/4/16    Obesity     Ocular hypertension 2016    Ovarian cyst     Overactive bladder        Past Surgical History:   Procedure Laterality Date    HX DILATION AND CURETTAGE  04/02/2015    D&C; REMOVAL OF ENDOMETRIAL POLYP     HX HYSTEROSCOPY WITH ENDOMETRIAL ABLATION  2014    HX PARTIAL HYSTERECTOMY  04/2019    HX TUBAL LIGATION  2010    tubal ligation        Current Outpatient Medications   Medication Sig Dispense Refill    multivitamin-min-iron-FA-vit K (Bariatric Multivitamins) 45 mg iron- 800 mcg-120 mcg cap Take  by mouth two (2) times a day.       minocycline (DYNACIN) 100 mg tablet TK 1 T PO QD WF      spironolactone (ALDACTONE) 100 mg tablet TK 1 T PO D      calcium-cholecalciferol, d3, (CALCIUM 600 + D) 600-125 mg-unit tab Take 500 mg by mouth three (3) times daily.  cholecalciferol, vitamin D3, 50 mcg (2,000 unit) tab Take  by mouth.  cyanocobalamin 1,000 mcg tablet Take 1,000 mcg by mouth daily.  sertraline (ZOLOFT) 100 mg tablet Take 150 mg by mouth.  TRAVATAN Z 0.004 % ophthalmic solution INT 1 GTT IN OU HS      traZODone (DESYREL) 50 mg tablet Take 25-50 mg by mouth.  oxybutynin chloride XL (DITROPAN XL) 5 mg CR tablet Take 10 mg by mouth daily.  hyoscyamine SL (LEVSIN/SL) 0.125 mg SL tablet 1 Tab by SubLINGual route every six (6) hours as needed for Cramping. 12 Tab 0       Allergies   Allergen Reactions    Morphine Itching         Objective:     Visit Vitals  /74   Pulse 74   Temp 97.9 °F (36.6 °C) (Temporal)   Resp 18   Ht 5' 4\" (1.626 m)   Wt 103.4 kg (228 lb)   LMP 04/09/2019   SpO2 97%   BMI 39.14 kg/m²       General:  alert, cooperative, no distress, appears stated age   Chest: no accessory muscle use   Cor:   Regular rate and rhythm   Abdomen: soft, bowel sounds active, non-tender         Labs:   Recent Results (from the past 672 hour(s))   ALBUMIN    Collection Time: 11/28/20  9:24 AM   Result Value Ref Range    Albumin 4.0 3.5 - 5.0 g/dL   CBC WITH AUTOMATED DIFF    Collection Time: 11/28/20  9:24 AM   Result Value Ref Range    WBC 6.6 4.0 - 11.0 K/uL    RBC 5.13 3.80 - 5.20 M/uL    HGB 12.7 11.7 - 15.5 g/dL    HCT 40.4 35.1 - 46.5 %    MCV 79 (L) 81 - 99 fL    MCH 25 (L) 26 - 34 pg    MCHC 31 31 - 36 g/dL    RDW 15.7 (H) 10.0 - 15.5 %    PLATELET 673 378 - 266 K/uL    MPV 12.9 9.0 - 13.0 fL    NEUTROPHILS 45 40 - 75 %    Lymphocytes 45 20 - 45 %    MONOCYTES 9 3 - 12 %    EOSINOPHILS 2 0 - 6 %    BASOPHILS 1 0 - 2 %    ABS. NEUTROPHILS 2.9 1.8 - 7.7 K/uL    ABSOLUTE LYMPHOCYTE COUNT 2.9 1.0 - 4.8 K/uL    ABS. MONOCYTES 0.6 0.1 - 1.0 K/uL    ABS. EOSINOPHILS 0.1 0.0 - 0.5 K/uL    ABS.  BASOPHILS 0.0 0.0 - 0.2 K/uL   FERRITIN    Collection Time: 11/28/20  9:24 AM   Result Value Ref Range    Ferritin 33 10 - 291 ng/mL   IRON    Collection Time: 11/28/20  9:24 AM   Result Value Ref Range    Iron 57 30 - 903 mcg/dL   METABOLIC PANEL, BASIC    Collection Time: 11/28/20  9:24 AM   Result Value Ref Range    Glucose 92 70 - 99 mg/dL    BUN 7 6 - 22 mg/dL    Creatinine 0.5 0.5 - 1.2 mg/dL    Sodium 139 133 - 145 mmol/L    Potassium 3.9 3.5 - 5.5 mmol/L    Chloride 103 98 - 110 mmol/L    CO2 25 20 - 32 mmol/L    Calcium 9.6 8.4 - 10.5 mg/dL    Anion gap 11.4 3.0 - 15.0 mmol/L    GFRAA >60.0 >60.0    GFRNA >60.0 >60.0   VITAMIN D, 25 HYDROXY    Collection Time: 11/28/20  9:24 AM   Result Value Ref Range    VITAMIN D, 25-HYDROXY 41.5 32.0 - 100.0 ng/mL   VITAMIN B12 & FOLATE    Collection Time: 11/28/20  9:24 AM   Result Value Ref Range    Vitamin B12 814 211 - 911 pg/mL    Folate 2.91 (L) >=3.10 ng/mL   [facility] Collection Time: 11/28/20  9:33 AM   Result Value Ref Range    [facility] SPECIMEN COL Specimens collected/sent to [facility]         Assessment/Plan: TodayJoshua is  Height: 5' 4\" (162.6 cm) tall, Weight: 103.4 kg (228 lb) lbs for a Body mass index is 39.14 kg/m². and are suffering from obesity . They are currently 3 months s/p laparoscopic gastric bypass surgery with a total weight loss of 57lbs to date, doing well overall. Labs were reviewed  Stressed importance of hydration with SF clear liquids until urine clear. Continue with food content mainly protein veggies. Encouraged support group attendance. Advised exercise program of 150 mins per week. We discussed the expected course, resolution and complications of the diagnosis(es) in detail. Medication risks, benefits, costs, interactions, and alternatives were discussed as indicated. I advised her to contact the office if her condition worsens, changes or fails to improve as anticipated. She expressed understanding with the diagnosis(es) and plan. The primary encounter diagnosis was Postoperative malabsorption. Diagnoses of Morbid obesity ([facility] [address] and BMI 39.0-39.9,adult were also pertinent to this visit. Follow-up and Dispositions    · Return in about 3 months (around 3/2/2021). with labs, sooner as needed.

## 2020-12-02 NOTE — PATIENT INSTRUCTIONS
Orders Placed This Encounter    ALBUMIN     Standing Status:   Future     Standing Expiration Date:   12/3/2021    CBC WITH AUTOMATED DIFF     Standing Status:   Future     Standing Expiration Date:   12/3/2021    IRON     Standing Status:   Future     Standing Expiration Date:   12/3/2021    LIPID PANEL     Standing Status:   Future     Standing Expiration Date:   12/3/2021    VITAMIN B12 & FOLATE     Standing Status:   Future     Standing Expiration Date:   12/3/2021    VITAMIN B1, WHOLE BLOOD     Standing Status:   Future     Standing Expiration Date:   12/3/2021    TSH 3RD GENERATION     Standing Status:   Future     Standing Expiration Date:   12/3/2021    multivitamin-min-iron-FA-vit K (Bariatric Multivitamins) 45 mg iron- 800 mcg-120 mcg cap     Sig: Take  by mouth two (2) times a day.     minocycline (DYNACIN) 100 mg tablet     Sig: TK 1 T PO QD WF    spironolactone (ALDACTONE) 100 mg tablet     Sig: TK 1 T PO D

## 2021-03-25 ENCOUNTER — HOSPITAL ENCOUNTER (OUTPATIENT)
Dept: LAB | Age: 41
Discharge: HOME | End: 2021-03-25

## 2021-03-25 LAB — SENTARA SPECIMEN COL,SENBCF: NORMAL

## 2021-03-25 PROCEDURE — 99001 SPECIMEN HANDLING PT-LAB: CPT

## 2021-03-26 LAB
ABSOLUTE LYMPHOCYTE COUNT, 10803: 3.4 K/UL (ref 1–4.8)
ALBUMIN SERPL-MCNC: 4.1 G/DL (ref 3.5–5)
BASOPHILS # BLD: 0 K/UL (ref 0–0.2)
BASOPHILS NFR BLD: 1 % (ref 0–2)
CHOLEST SERPL-MCNC: 169 MG/DL (ref 110–200)
EOSINOPHIL # BLD: 0.1 K/UL (ref 0–0.5)
EOSINOPHIL NFR BLD: 1 % (ref 0–6)
ERYTHROCYTE [DISTWIDTH] IN BLOOD BY AUTOMATED COUNT: 16.6 % (ref 10–15.5)
FOLATE,FOL: 4.42 NG/ML
GRANULOCYTES,GRANS: 38 % (ref 40–75)
HCT VFR BLD AUTO: 44.9 % (ref 35.1–46.5)
HDLC SERPL-MCNC: 2.4 MG/DL (ref 0–5)
HDLC SERPL-MCNC: 71 MG/DL
HGB BLD-MCNC: 13.5 G/DL (ref 11.7–15.5)
IRON,IRN: 88 MCG/DL (ref 30–160)
LDL/HDL RATIO,LDHD: 1.2
LDLC SERPL CALC-MCNC: 82 MG/DL (ref 50–99)
LYMPHOCYTES, LYMLT: 54 % (ref 20–45)
MCH RBC QN AUTO: 25 PG (ref 26–34)
MCHC RBC AUTO-ENTMCNC: 30 G/DL (ref 31–36)
MCV RBC AUTO: 84 FL (ref 81–99)
MONOCYTES # BLD: 0.5 K/UL (ref 0.1–1)
MONOCYTES NFR BLD: 7 % (ref 3–12)
NEUTROPHILS # BLD AUTO: 2.4 K/UL (ref 1.8–7.7)
NON-HDL CHOLESTEROL, 011976: 98 MG/DL
PLATELET # BLD AUTO: 343 K/UL (ref 140–440)
PMV BLD AUTO: 13 FL (ref 9–13)
RBC # BLD AUTO: 5.36 M/UL (ref 3.8–5.2)
TRIGL SERPL-MCNC: 79 MG/DL (ref 40–149)
TSH SERPL DL<=0.005 MIU/L-ACNC: 0.92 MCU/ML (ref 0.27–4.2)
VIT B12 SERPL-MCNC: 314 PG/ML (ref 211–911)
VLDLC SERPL CALC-MCNC: 16 MG/DL (ref 8–30)
WBC # BLD AUTO: 6.4 K/UL (ref 4–11)

## 2021-04-05 ENCOUNTER — OFFICE VISIT (OUTPATIENT)
Dept: SURGERY | Age: 41
End: 2021-04-05
Payer: MEDICAID

## 2021-04-05 VITALS
TEMPERATURE: 97.2 F | HEIGHT: 64 IN | BODY MASS INDEX: 36.02 KG/M2 | WEIGHT: 211 LBS | DIASTOLIC BLOOD PRESSURE: 68 MMHG | HEART RATE: 73 BPM | RESPIRATION RATE: 20 BRPM | SYSTOLIC BLOOD PRESSURE: 121 MMHG

## 2021-04-05 DIAGNOSIS — K91.2 POSTOPERATIVE MALABSORPTION: Primary | ICD-10-CM

## 2021-04-05 PROCEDURE — 99213 OFFICE O/P EST LOW 20 MIN: CPT | Performed by: SURGERY

## 2021-04-05 NOTE — PROGRESS NOTES
Subjective:      Felipe Hayden is a 39 y.o. female is now 8 months status post laparoscopic gastric bypass surgery. Doing well overall. Currently on a stage 4 diet without difficulty. Taking in 64oz water,  60g protein. 20min of activity daily. She is snacking on starches and drinking sweet tea daily. Bowel movements are regular. .  The patient is not compliant with multivitamins, calcium and B12 supplements. Weight Loss Metrics 4/5/2021 4/5/2021 12/2/2020 12/2/2020 9/3/2020 9/3/2020 7/29/2020   Pre op / Initial Wt 283 - 285 - 283 - -   Today's Wt - 211 lb - 228 lb - 260 lb -   BMI - 36.22 kg/m2 - 39.14 kg/m2 - 44.63 kg/m2 46.35 kg/m2   Ideal Body Wt 131 - 131 - 131 - -   Excess Body Wt 152 - 154 - 152 - -   Goal Wt 162 - - - 162 - -   Wt loss to date 72 - 57 - 23 - -   % Wt Loss 0.59 - 0.46 - 0.19 - -   80% .6 - 123.2 - 121.6 - -       Body mass index is 36.22 kg/m². Comorbidities:    Hypertension: not applicable  Diabetes: not applicable  Obstructive Sleep Apnea: not applicable  Hyperlipidemia: not applicable  Stress Urinary Incontinence: not applicable  Gastroesophageal Reflux: not applicable  Weight related arthropathy:not applicable        Past Medical History:   Diagnosis Date    Acne     Anxiety     Depression     Endometrial polyp     History of heavy vaginal bleeding     Hx of gastric bypass 07/29/2020    Iron deficiency anemia     had iron infusions every other week x4.   Last one was 8/4/16    Obesity     Ocular hypertension 2016    Ovarian cyst     Overactive bladder        Past Surgical History:   Procedure Laterality Date    HX DILATION AND CURETTAGE  04/02/2015    D&C; REMOVAL OF ENDOMETRIAL POLYP     HX HYSTEROSCOPY WITH ENDOMETRIAL ABLATION  2014    HX PARTIAL HYSTERECTOMY  04/2019    HX TUBAL LIGATION  2010    tubal ligation        Current Outpatient Medications   Medication Sig Dispense Refill    multivitamin-min-iron-FA-vit K (Bariatric Multivitamins) 45 mg iron- 800 mcg-120 mcg cap Take  by mouth two (2) times a day.  spironolactone (ALDACTONE) 100 mg tablet TK 1 T PO D      calcium-cholecalciferol, d3, (CALCIUM 600 + D) 600-125 mg-unit tab Take 500 mg by mouth three (3) times daily.  cyanocobalamin 1,000 mcg tablet Take 1,000 mcg by mouth daily.  sertraline (ZOLOFT) 100 mg tablet Take 150 mg by mouth.  TRAVATAN Z 0.004 % ophthalmic solution INT 1 GTT IN OU HS      traZODone (DESYREL) 50 mg tablet Take 25-50 mg by mouth.  oxybutynin chloride XL (DITROPAN XL) 5 mg CR tablet Take 10 mg by mouth daily.  cholecalciferol, vitamin D3, 50 mcg (2,000 unit) tab Take  by mouth. Allergies   Allergen Reactions    Morphine Itching         Objective:     Visit Vitals  /68 (BP 1 Location: Left upper arm, BP Patient Position: At rest, BP Cuff Size: Adult)   Pulse 73   Temp 97.2 °F (36.2 °C) (Oral)   Resp 20   Ht 5' 4\" (1.626 m)   Wt 95.7 kg (211 lb)   LMP 04/09/2019   BMI 36.22 kg/m²       General:  alert, cooperative, no distress, appears stated age   Chest: no accessory muscle use   Cor:   Regular rate and rhythm   Abdomen: soft, bowel sounds active, non-tender   Incision:   healed       Recent Results (from the past 672 hour(s))   [address] [place] [address].     Collection Time: 03/25/21 10:20 AM   Result Value Ref Range    [facility] SPECIMEN COL Specimens collected/sent to [facility]     LIPID PANEL    Collection Time: 03/25/21 10:30 AM   Result Value Ref Range    Triglyceride 79 40 - 149 mg/dL    HDL Cholesterol 71 >=40 mg/dL    Cholesterol, total 169 110 - 200 mg/dL    CHOLESTEROL/HDL 2.4 0.0 - 5.0    Non-HDL Cholesterol 98 <130 mg/dL    LDL, calculated 82 50 - 99 mg/dL    VLDL, calculated 16 8 - 30 mg/dL    LDL/HDL Ratio 1.2    IRON    Collection Time: 03/25/21 10:30 AM   Result Value Ref Range    Iron 88 30 - 160 mcg/dL   TSH 3RD GENERATION    Collection Time: 03/25/21 10:30 AM   Result Value Ref Range    TSH 0.92 0.27 - 4.20 mcU/mL   VITAMIN B12 & FOLATE    Collection Time: 03/25/21 10:30 AM   Result Value Ref Range    Vitamin B12 314 211 - 911 pg/mL    Folate 4.42 >=3.10 ng/mL   CBC WITH AUTOMATED DIFF    Collection Time: 03/25/21 10:30 AM   Result Value Ref Range    WBC 6.4 4.0 - 11.0 K/uL    RBC 5.36 (H) 3.80 - 5.20 M/uL    HGB 13.5 11.7 - 15.5 g/dL    HCT 44.9 35.1 - 46.5 %    MCV 84 81 - 99 fL    MCH 25 (L) 26 - 34 pg    MCHC 30 (L) 31 - 36 g/dL    RDW 16.6 (H) 10.0 - 15.5 %    PLATELET 347 309 - 869 K/uL    MPV 13.0 9.0 - 13.0 fL    NEUTROPHILS 38 (L) 40 - 75 %    Lymphocytes 54 (H) 20 - 45 %    MONOCYTES 7 3 - 12 %    EOSINOPHILS 1 0 - 6 %    BASOPHILS 1 0 - 2 %    ABS. NEUTROPHILS 2.4 1.8 - 7.7 K/uL    ABSOLUTE LYMPHOCYTE COUNT 3.4 1.0 - 4.8 K/uL    ABS. MONOCYTES 0.5 0.1 - 1.0 K/uL    ABS. EOSINOPHILS 0.1 0.0 - 0.5 K/uL    ABS.  BASOPHILS 0.0 0.0 - 0.2 K/uL   ALBUMIN    Collection Time: 03/25/21 10:30 AM   Result Value Ref Range    Albumin 4.1 3.5 - 5.0 g/dL       Assessment:     Postoperative course complicated by inattention to post-op training, vitamins, calcium and eating goals    Plan:     Increase activity to the goal of 30 minutes daily, Follow up labs as ordered, Follow up with Registered Dietician and Stop snack, resume vitamins and calcium  Follow up in 4 months

## 2021-04-05 NOTE — PROGRESS NOTES
Felipe Hayden is a 39 y.o. female that presents today to follow up post  LGBP  preformed on 7/29/2020. Pt says pain level is at a 0 on a scale from 1-10. Pt is drinking 65oz of fluid daily. Pt is currently eating steak,seafood,chicken, eggs, collards cabbage, broccoli ,string beans, corn, occ french fries. Pt says the amount of time spent exercising is Robert and walking. Body mass index is 36.22 kg/m². 1. Have you been to the ER, urgent care clinic since your last visit? Hospitalized since your last visit? No    2. Have you seen or consulted any other health care providers outside of the [address] [place] [address] since your last visit? Include any pap smears or colon screening.  No

## 2021-07-01 DIAGNOSIS — K91.2 POSTOPERATIVE MALABSORPTION: Primary | ICD-10-CM

## 2021-09-28 ENCOUNTER — HOSPITAL ENCOUNTER (OUTPATIENT)
Dept: LAB | Age: 41
Discharge: HOME | End: 2021-09-28

## 2021-09-28 LAB
SENTARA SPECIMEN COL,SENBCF: NORMAL
SENTARA SPECIMEN COL,SENBCF: NORMAL

## 2021-09-28 PROCEDURE — 99001 SPECIMEN HANDLING PT-LAB: CPT

## 2021-09-29 LAB
25(OH)D3 SERPL-MCNC: 40.4 NG/ML (ref 32–100)
A-G RATIO,AGRAT: 1.5 RATIO (ref 1.1–2.6)
ABSOLUTE LYMPHOCYTE COUNT, 10803: 2.7 K/UL (ref 1–4.8)
ALBUMIN SERPL-MCNC: 4 G/DL (ref 3.5–5)
ALBUMIN SERPL-MCNC: 4.1 G/DL (ref 3.5–5)
ALP SERPL-CCNC: 72 U/L (ref 25–115)
ALT SERPL-CCNC: 16 U/L (ref 5–40)
ANION GAP SERPL CALC-SCNC: 8 MMOL/L (ref 3–15)
AST SERPL W P-5'-P-CCNC: 18 U/L (ref 10–37)
BASOPHILS # BLD: 0 K/UL (ref 0–0.2)
BASOPHILS NFR BLD: 1 % (ref 0–2)
BILIRUB SERPL-MCNC: 0.5 MG/DL (ref 0.2–1.2)
BILIRUBIN, DIRECT,CBIL: <0.2 MG/DL (ref 0–0.3)
BUN SERPL-MCNC: 7 MG/DL (ref 6–22)
CALCIUM SERPL-MCNC: 9.5 MG/DL (ref 8.4–10.5)
CHLORIDE SERPL-SCNC: 104 MMOL/L (ref 98–110)
CO2 SERPL-SCNC: 27 MMOL/L (ref 20–32)
CREAT SERPL-MCNC: 0.5 MG/DL (ref 0.5–1.2)
EOSINOPHIL # BLD: 0.1 K/UL (ref 0–0.5)
EOSINOPHIL NFR BLD: 1 % (ref 0–6)
ERYTHROCYTE [DISTWIDTH] IN BLOOD BY AUTOMATED COUNT: 15.1 % (ref 10–15.5)
FERRITIN SERPL-MCNC: 17 NG/ML (ref 10–291)
FOLATE,FOL: 5.21 NG/ML
GFRAA, 66117: >60
GFRNA, 66118: >60
GLOBULIN,GLOB: 2.8 G/DL (ref 2–4)
GLUCOSE SERPL-MCNC: 101 MG/DL (ref 70–99)
GRANULOCYTES,GRANS: 28 % (ref 40–75)
HCT VFR BLD AUTO: 45.9 % (ref 35.1–46.5)
HGB BLD-MCNC: 13.6 G/DL (ref 11.7–15.5)
IRON,IRN: 84 MCG/DL (ref 30–160)
LYMPHOCYTES, LYMLT: 61 % (ref 20–45)
MCH RBC QN AUTO: 26 PG (ref 26–34)
MCHC RBC AUTO-ENTMCNC: 30 G/DL (ref 31–36)
MCV RBC AUTO: 88 FL (ref 81–99)
MONOCYTES # BLD: 0.4 K/UL (ref 0.1–1)
MONOCYTES NFR BLD: 9 % (ref 3–12)
NEUTROPHILS # BLD AUTO: 1.3 K/UL (ref 1.8–7.7)
PLATELET # BLD AUTO: 313 K/UL (ref 140–440)
PMV BLD AUTO: 12.7 FL (ref 9–13)
POTASSIUM SERPL-SCNC: 4.2 MMOL/L (ref 3.5–5.5)
PROT SERPL-MCNC: 6.9 G/DL (ref 6.4–8.3)
RBC # BLD AUTO: 5.23 M/UL (ref 3.8–5.2)
SODIUM SERPL-SCNC: 139 MMOL/L (ref 133–145)
VIT B12 SERPL-MCNC: 324 PG/ML (ref 211–911)
WBC # BLD AUTO: 4.5 K/UL (ref 4–11)

## 2021-09-29 NOTE — PROGRESS NOTES
She had labs completed but does not have an appointment scheduled. Please ensure she makes an appointment for her 12 month follow up, we have not seen her since April.

## 2021-10-03 LAB — VITAMIN B1, WHOLE BLOOD, 66250: 100.4 NMOL/L (ref 66.5–200)

## 2021-10-04 ENCOUNTER — OFFICE VISIT (OUTPATIENT)
Dept: SURGERY | Age: 41
End: 2021-10-04
Payer: MEDICAID

## 2021-10-04 VITALS
WEIGHT: 196 LBS | BODY MASS INDEX: 33.46 KG/M2 | TEMPERATURE: 99 F | DIASTOLIC BLOOD PRESSURE: 79 MMHG | HEART RATE: 57 BPM | SYSTOLIC BLOOD PRESSURE: 127 MMHG | OXYGEN SATURATION: 100 % | HEIGHT: 64 IN

## 2021-10-04 DIAGNOSIS — Z98.84 S/P GASTRIC BYPASS: ICD-10-CM

## 2021-10-04 DIAGNOSIS — K91.2 POST-RESECTION MALABSORPTION: Primary | ICD-10-CM

## 2021-10-04 DIAGNOSIS — R73.01 ELEVATED FASTING GLUCOSE: ICD-10-CM

## 2021-10-04 DIAGNOSIS — E66.9 OBESITY (BMI 30-39.9): ICD-10-CM

## 2021-10-04 DIAGNOSIS — E16.1 REACTIVE HYPOGLYCEMIA: ICD-10-CM

## 2021-10-04 PROCEDURE — 99213 OFFICE O/P EST LOW 20 MIN: CPT | Performed by: NURSE PRACTITIONER

## 2021-10-04 NOTE — PROGRESS NOTES
Bariatric Postoperative Progress Note    CC: 12 Month LGBP Follow Up    Carrie Duran is a 39 y.o. female now 1 years status post laparoscopic gastric bypass surgery performed on 7/29/20. Doing well overall. Currently eating fish, chicken, nuts, seafood, broccoli, corn, carrots, squash, collards, strawberries, grapes, pineapple, occ bread, pasta, rice, potatoes, cupcake, ginger ale. Taking in 50-55 oz water,  Unknown g protein. 45 min of gavino/free weights 3 days a week. Bowel movements are regular. The patient is not having any pain. She is compliant with multivitamins, calcium, Vit D and B12 supplements. Medical marijuana card, currently eating gummies. ETOH occasionally, denies tobacco or NSAID use. Goal weight: 165    Weight Loss Metrics 10/4/2021 10/4/2021 4/5/2021 4/5/2021 12/2/2020 12/2/2020 9/3/2020   Pre op / Initial Wt 283 - 283 - 285 - 283   Today's Wt - 196 lb - 211 lb - 228 lb -   BMI - 33.64 kg/m2 - 36.22 kg/m2 - 39.14 kg/m2 -   Ideal Body Wt 131 - 131 - 131 - 131   Excess Body Wt 152 - 152 - 154 - 152   Goal Wt 162 - 162 - - - 162   Wt loss to date 87 - 72 - 57 - 23   % Wt Loss 0.72 - 0.59 - 0.46 - 0.19   80% .6 - 121.6 - 123.2 - 121.6       Comorbidities:  Hypertension: not applicable  Diabetes: not applicable  Obstructive Sleep Apnea: not applicable  Hyperlipidemia: not applicable  Stress Urinary Incontinence: improved  Gastroesophageal Reflux: not applicable  Weight related arthropathy:improved        Past Medical History:   Diagnosis Date    Acne     Anxiety     Depression     Endometrial polyp     History of heavy vaginal bleeding     Hx of gastric bypass 07/29/2020    Iron deficiency anemia     had iron infusions every other week x4.   Last one was 8/4/16    Obesity     Ocular hypertension 2016    Ovarian cyst     Overactive bladder        Past Surgical History:   Procedure Laterality Date    HX DILATION AND CURETTAGE  04/02/2015    D&C; REMOVAL OF ENDOMETRIAL POLYP     HX HYSTEROSCOPY WITH ENDOMETRIAL ABLATION  2014    HX PARTIAL HYSTERECTOMY  04/2019    HX TUBAL LIGATION  2010    tubal ligation        Current Outpatient Medications   Medication Sig Dispense Refill    medical marijuana Take 1 Each by mouth daily as needed for Pain.  multivitamin-min-iron-FA-vit K (Bariatric Multivitamins) 45 mg iron- 800 mcg-120 mcg cap Take  by mouth two (2) times a day.  calcium-cholecalciferol, d3, (CALCIUM 600 + D) 600-125 mg-unit tab Take 500 mg by mouth three (3) times daily.  cholecalciferol, vitamin D3, 50 mcg (2,000 unit) tab Take  by mouth.  cyanocobalamin 1,000 mcg tablet Take 1,000 mcg by mouth daily.  sertraline (ZOLOFT) 100 mg tablet Take 150 mg by mouth.  TRAVATAN Z 0.004 % ophthalmic solution INT 1 GTT IN OU HS      traZODone (DESYREL) 50 mg tablet Take 25-50 mg by mouth. (Patient not taking: Reported on 10/4/2021)         Allergies   Allergen Reactions    Morphine Itching       ROS:  Review of Systems   Constitutional: Positive for weight loss. Negative for malaise/fatigue. Eyes: Negative. Respiratory: Negative for cough and shortness of breath. Cardiovascular: Negative for chest pain, palpitations and leg swelling. Gastrointestinal: Negative for abdominal pain, blood in stool, constipation, diarrhea, heartburn, nausea and vomiting. Genitourinary: Negative. Musculoskeletal: Positive for back pain and joint pain. Skin: Positive for itching. Negative for rash. Neurological: Negative for dizziness, tingling, loss of consciousness, weakness and headaches. Physicial Exam:  Visit Vitals  /79 (BP 1 Location: Left upper arm, BP Patient Position: Sitting)   Pulse (!) 57   Temp 99 °F (37.2 °C)   Ht 5' 4\" (1.626 m)   Wt 88.9 kg (196 lb)   LMP 04/09/2019   SpO2 100%   BMI 33.64 kg/m²     Physical Exam  Vitals and nursing note reviewed. Constitutional:       Appearance: She is obese.    HENT:      Head: Normocephalic and atraumatic. Cardiovascular:      Rate and Rhythm: Normal rate. Pulses: Normal pulses. Heart sounds: Normal heart sounds. Pulmonary:      Effort: Pulmonary effort is normal.      Breath sounds: Normal breath sounds. Abdominal:      General: Bowel sounds are normal. There is no distension. Palpations: Abdomen is soft. There is no mass. Tenderness: There is no abdominal tenderness. Hernia: No hernia is present. Musculoskeletal:         General: Normal range of motion. Skin:     General: Skin is warm and dry. Neurological:      General: No focal deficit present. Mental Status: She is alert and oriented to person, place, and time. Psychiatric:         Mood and Affect: Mood normal.         Behavior: Behavior normal.         Labs:   Recent Results (from the past 672 hour(s))   [address] [place] [address]. Collection Time: 09/28/21 10:45 AM   Result Value Ref Range    [facility] Specimens collected/sent to [address] [place] [address].     Collection Time: 09/28/21 10:45 AM   Result Value Ref Range    [facility] Specimens collected/sent to [facility]     METABOLIC PANEL, BASIC    Collection Time: 09/28/21 10:45 AM   Result Value Ref Range    Glucose 101 (H) 70 - 99 mg/dL    BUN 7 6 - 22 mg/dL    Creatinine 0.5 0.5 - 1.2 mg/dL    Sodium 139 133 - 145 mmol/L    Potassium 4.2 3.5 - 5.5 mmol/L    Chloride 104 98 - 110 mmol/L    CO2 27 20 - 32 mmol/L    Calcium 9.5 8.4 - 10.5 mg/dL    Anion gap 8.0 3.0 - 15.0 mmol/L    GFRAA >60.0 >60.0    GFRNA >60.0 >60.0   IRON    Collection Time: 09/28/21 10:45 AM   Result Value Ref Range    Iron 84 30 - 160 mcg/dL   VITAMIN B12 & FOLATE    Collection Time: 09/28/21 10:45 AM   Result Value Ref Range    Vitamin B12 324 211 - 911 pg/mL    Folate 5.21 >=3.10 ng/mL   VITAMIN D, 25 HYDROXY    Collection Time: 09/28/21 10:45 AM   Result Value Ref Range    VITAMIN D, 25-HYDROXY 40.4 32.0 - 100.0 ng/mL   FERRITIN Collection Time: 09/28/21 10:45 AM   Result Value Ref Range    Ferritin 17 10 - 291 ng/mL   CBC WITH AUTOMATED DIFF    Collection Time: 09/28/21 10:45 AM   Result Value Ref Range    WBC 4.5 4.0 - 11.0 K/uL    RBC 5.23 (H) 3.80 - 5.20 M/uL    HGB 13.6 11.7 - 15.5 g/dL    HCT 45.9 35.1 - 46.5 %    MCV 88 81 - 99 fL    MCH 26 26 - 34 pg    MCHC 30 (L) 31 - 36 g/dL    RDW 15.1 10.0 - 15.5 %    PLATELET 363 779 - 793 K/uL    MPV 12.7 9.0 - 13.0 fL    NEUTROPHILS 28 (L) 40 - 75 %    Lymphocytes 61 (H) 20 - 45 %    MONOCYTES 9 3 - 12 %    EOSINOPHILS 1 0 - 6 %    BASOPHILS 1 0 - 2 %    ABS. NEUTROPHILS 1.3 (L) 1.8 - 7.7 K/uL    ABSOLUTE LYMPHOCYTE COUNT 2.7 1.0 - 4.8 K/uL    ABS. MONOCYTES 0.4 0.1 - 1.0 K/uL    ABS. EOSINOPHILS 0.1 0.0 - 0.5 K/uL    ABS. BASOPHILS 0.0 0.0 - 0.2 K/uL   HEPATIC FUNCTION PANEL    Collection Time: 09/28/21 10:45 AM   Result Value Ref Range    AST (SGOT) 18 10 - 37 U/L    ALT (SGPT) 16 5 - 40 U/L    Alk. phosphatase 72 25 - 115 U/L    Bilirubin, total 0.5 0.2 - 1.2 mg/dL    Bilirubin, direct <0.2 0.0 - 0.3 mg/dL    Protein, total 6.9 6.4 - 8.3 g/dL    Albumin 4.1 3.5 - 5.0 g/dL    A-G Ratio 1.5 1.1 - 2.6 ratio    Globulin 2.8 2.0 - 4.0 g/dL   ALBUMIN    Collection Time: 09/28/21 10:45 AM   Result Value Ref Range    Albumin 4.0 3.5 - 5.0 g/dL   VITAMIN B1, WHOLE BLOOD    Collection Time: 09/28/21 10:48 AM   Result Value Ref Range    VITAMIN B1, WHOLE BLOOD 100.4 66.5 - 200.0 nmol/L       Assessment/Plan:   She is currently 1 year s/p laparoscopic gastric bypass surgery with a total weight loss of 87 lbs to date, doing well. Labs were reviewed and pt was instructed to continue MVI/B1/B12/D supplementation. Ensure B12 is sublingual.   Pt reports she was fasting for bariatric labs, glucose slightly elevated. Will order A1c and lipids and call pt with results. Dangers of reactive hypoglycemia discussed. Cut out high density carbohydrates and increase SF fluid intake.    We have reviewed the components of a successful postoperative course including requirement for a high protein, low carbohydrate diet, 64 oz a day of zero calorie liquids, daily vitamin supplementation, daily exercise (150 mis/week), regular follow-up, and participation in support groups. Refer to registered dietitian for more detailed medical nutrition therapy as needed. The primary encounter diagnosis was Post-resection malabsorption. Diagnoses of S/P gastric bypass, Obesity (BMI 30-39.9), BMI 33.0-33.9,adult, Elevated fasting glucose, and Reactive hypoglycemia were also pertinent to this visit. Follow-up and Dispositions    · Return in about 1 year (around 10/4/2022), or if symptoms worsen or fail to improve. with labs, sooner as needed.   Holland Martinez NP

## 2021-10-04 NOTE — PATIENT INSTRUCTIONS
Blood Sugar Fluctuations After Gastric Bypass  Reactive Hypoglycemia  It is important to understand the unique changes that occur in your gastrointestinal tract after gastric bypass with regard to glucose absorption. Glucose is the basic unit of sugar and makes up nearly all carbohydrate foods (sweets, including candy and cookies; starches, including crackers and pretzels; and fruits). The anatomic changes in rerouting the stomach and intestines allows for rapid absorption of glucose into the bloodstream. This is a multistep process as follows:  1. Carbohydrate foods are chewed in the mouth (chips, crackers, etc...) and are exposed to an enzyme or chemical in the saliva (spit) that breaks it down to glucose--prior to swallowing. 2. Glucose/saliva mix is swallowed and spends very little time in the small gastric pouch as this solution is liquid/soft and not dense, therefore passes easily into the small intestine connection  3. Once in the small intestine, approx. 5-10 minutes after swallowing, the glucose is rapidly absorbed in the bloodstream and blood sugar rises to above normal levels (up to 200's in some cases). Most patients report no adverse feelings or symptoms from elevated blood sugar at this point. 4. After approximately 1-2 hours, the pancreas (organ responsible for regulating blood sugars) releases insulin (hormone that adjusts blood sugar) into the bloodstream in order to bring down the high blood sugar level. However, because of the delay in timing and the anatomic changes from the surgery, often the pancreas releases too much insulin, causing blood sugar to drop below (often quite below) normal levels (as low as 30's-40's). 5. When blood sugar drops to low levels (hypoglycemia), patients experience a range of symptoms including; significant and sudden fatigue, sweating, shaking, dizziness, anxiety, hunger, and possible fainting or loss of consciousness.  There is often variability in the number and severity of these symptoms. 6. When in a low blood sugar state, the patient often feels that they must ingest high carbohydrate foods or drinks in order to rapidly bring blood sugar back to normal. Unfortunately, doing this restarts the high blood sugar--low blood sugar process all over again (see steps 1-5 above). 7. Repeatedly experiencing this process, called reactive hypoglycemia often leads to weight regain, chronic fatigue, and could cause unexpected loss of consciousness, resulting in injury to the patient or others (especially if occurs while driving). 8. The most effective way to avoid experiencing reactive hypoglycemia and its serious consequences is to avoid carbohydrate foods in the diet. Sticking to the advised meal plan of proteins, vegetables, and healthy fats will keep blood sugar levels stable and assist with long term weight maintenance and prevention of obesity disease relapse.

## 2022-03-18 PROBLEM — K91.2 POSTOPERATIVE MALABSORPTION: Status: ACTIVE | Noted: 2020-09-04

## 2022-03-19 PROBLEM — N92.0 MENORRHAGIA: Status: ACTIVE | Noted: 2019-04-16

## 2022-09-22 DIAGNOSIS — K91.2 POSTOPERATIVE MALABSORPTION: Primary | ICD-10-CM

## 2022-09-22 DIAGNOSIS — K91.2 POSTOPERATIVE MALABSORPTION: ICD-10-CM

## 2023-06-01 NOTE — NURSE NAVIGATOR
Per MBSAQIP requirements:  Letter and email sent requesting follow up appointment. [facility] Nichole Cantrell 89      Dear Patient,    Your health is our main concern. It is important for your health to have follow-up lab work and to see your surgeon at 3 months, 6 months and annually after your weight loss surgery. Additionally, the Department of bariatric Surgery at our hospital is a member of the Metabolic and Bariatric Surgery Accreditation and Quality Improvement Program [employer]). As a participant in this program, we gather information on the outcomes of our patients after surgery. Please call the office for a follow up appointment at [phone] [place]) or [phone] [facility]). If you have moved out of the area or have changed surgeons please call us and let us know the name of your doctor. Your health and feedback are important to us. We greatly appreciate your response.        Thank you,  [facility] [address] [facility]

## 2023-08-10 ENCOUNTER — TELEPHONE (OUTPATIENT)
Age: 43
End: 2023-08-10

## 2023-08-10 DIAGNOSIS — K91.2 POST-RESECTION MALABSORPTION: Primary | ICD-10-CM

## 2023-08-10 DIAGNOSIS — Z98.84 S/P GASTRIC BYPASS: ICD-10-CM

## 2023-09-25 ENCOUNTER — HOSPITAL ENCOUNTER (OUTPATIENT)
Facility: HOSPITAL | Age: 43
Discharge: HOME | End: 2023-09-28

## 2023-09-25 LAB — SENTARA SPECIMEN COLLECTION: NORMAL

## 2023-09-28 NOTE — PROGRESS NOTES
Bariatric Postoperative Progress Note    CC: Annual Bariatric Follow Up/Back on Track    Pat Barragan is a 37 y.o. female now 3 years status post laparoscopic gastric bypass surgery performed on 7/29/2020. Doing well overall. Taking in 32+ oz sugar free fluids,  unknown g protein. 30 min of  walking 3 days a week. Bowel movements are regular. She is not compliant with recommended bariatric vitamin supplementation as she is only currently taking Women's One a Day MVI. Diet Recall:   B: coffee, bagel with cream cheese  L: wrap  D: seafood and veg  Occ soda  Snacking on chocolate covered almonds, popcorn, or chips. Weight raymundo 168 last year  ETOH (wine once weekly), denies tobacco and NSAIDs.       9/29/2023     2:44 PM 5/30/2023     9:53 AM 10/4/2021     1:05 PM 4/5/2021     9:19 AM   Weight Loss Metrics   Pre-op weight (manual entry) 283 lbs      Height 5' 5\" 5' 5\" 5' 4\" 5' 4\"   Weight - Scale 182 lbs 178 lbs 196 lbs 211 lbs   BMI (Calculated) 30.3 kg/m2 29.7 kg/m2 33.7 kg/m2 36.3 kg/m2   Ideal body weight (manual entry) 131 lbs      EBW in lbs. 152      Weight loss to date in lbs. 101      Percent weight loss (%) 35.69 %      Percent EBW loss (%) 66.4 %          Comorbidities:  Hypertension: not applicable  Diabetes: not applicable  Obstructive Sleep Apnea: not applicable  Hyperlipidemia: not applicable  Stress Urinary Incontinence: resolved  Gastroesophageal Reflux: not applicable  Weight related arthropathy:improved      Past Medical History:   Diagnosis Date    Acne     Anxiety     Depression     Endometrial polyp     History of heavy vaginal bleeding     Hx of gastric bypass 07/29/2020    Iron deficiency anemia     had iron infusions every other week x4.   Last one was 8/4/16    Obesity     Ocular hypertension 2016    Ovarian cyst     Overactive bladder        Past Surgical History:   Procedure Laterality Date    DILATION AND CURETTAGE OF UTERUS  04/02/2015    D&C; REMOVAL OF ENDOMETRIAL POLYP

## 2023-09-29 ENCOUNTER — OFFICE VISIT (OUTPATIENT)
Age: 43
End: 2023-09-29
Payer: COMMERCIAL

## 2023-09-29 VITALS
HEART RATE: 80 BPM | SYSTOLIC BLOOD PRESSURE: 122 MMHG | TEMPERATURE: 97.8 F | BODY MASS INDEX: 30.32 KG/M2 | WEIGHT: 182 LBS | OXYGEN SATURATION: 99 % | RESPIRATION RATE: 16 BRPM | HEIGHT: 65 IN | DIASTOLIC BLOOD PRESSURE: 84 MMHG

## 2023-09-29 DIAGNOSIS — E66.9 CLASS 1 OBESITY WITHOUT SERIOUS COMORBIDITY WITH BODY MASS INDEX (BMI) OF 30.0 TO 30.9 IN ADULT, UNSPECIFIED OBESITY TYPE: ICD-10-CM

## 2023-09-29 DIAGNOSIS — K90.821 SHORT BOWEL SYNDROME WITH COLON IN CONTINUITY: Primary | ICD-10-CM

## 2023-09-29 DIAGNOSIS — E53.8 B12 DEFICIENCY: ICD-10-CM

## 2023-09-29 DIAGNOSIS — E55.9 VITAMIN D DEFICIENCY: ICD-10-CM

## 2023-09-29 DIAGNOSIS — Z98.84 S/P GASTRIC BYPASS: ICD-10-CM

## 2023-09-29 PROCEDURE — 99214 OFFICE O/P EST MOD 30 MIN: CPT | Performed by: NURSE PRACTITIONER

## 2023-09-29 RX ORDER — CHOLECALCIFEROL (VITAMIN D3) 1250 MCG
50000 CAPSULE ORAL
Qty: 12 CAPSULE | Refills: 0 | Status: SHIPPED | OUTPATIENT
Start: 2023-09-29

## 2023-09-29 RX ORDER — CLINDAMYCIN PHOSPHATE AND BENZOYL PEROXIDE 10; 50 MG/G; MG/G
GEL TOPICAL
COMMUNITY
Start: 2023-08-04

## 2023-09-29 RX ORDER — CYANOCOBALAMIN 1000 UG/ML
1000 INJECTION, SOLUTION INTRAMUSCULAR; SUBCUTANEOUS ONCE
Status: COMPLETED | OUTPATIENT
Start: 2023-09-29 | End: 2023-09-29

## 2023-09-29 RX ADMIN — CYANOCOBALAMIN 1000 MCG: 1000 INJECTION, SOLUTION INTRAMUSCULAR; SUBCUTANEOUS at 15:27

## 2023-09-29 ASSESSMENT — ENCOUNTER SYMPTOMS
CONSTIPATION: 0
VOMITING: 0
ABDOMINAL PAIN: 0
NAUSEA: 0
BLOOD IN STOOL: 0
DIARRHEA: 0

## 2023-09-30 LAB
A/G RATIO: 1.7 RATIO (ref 1.1–2.6)
ALBUMIN SERPL-MCNC: 4.3 G/DL (ref 3.5–5)
ALP BLD-CCNC: 57 U/L (ref 25–115)
ALT SERPL-CCNC: 16 U/L (ref 5–40)
ANION GAP SERPL CALCULATED.3IONS-SCNC: 8 MMOL/L (ref 3–15)
AST SERPL-CCNC: 13 U/L (ref 10–37)
BASOPHILS # BLD: 0 % (ref 0–2)
BASOPHILS ABSOLUTE: 0 K/UL (ref 0–0.2)
BILIRUB SERPL-MCNC: 0.2 MG/DL (ref 0.2–1.2)
BUN BLDV-MCNC: 10 MG/DL (ref 6–22)
CALCIUM SERPL-MCNC: 9.1 MG/DL (ref 8.4–10.5)
CHLORIDE BLD-SCNC: 104 MMOL/L (ref 98–110)
CO2: 25 MMOL/L (ref 20–32)
CREAT SERPL-MCNC: 0.6 MG/DL (ref 0.5–1.2)
EOSINOPHIL # BLD: 1 % (ref 0–6)
EOSINOPHILS ABSOLUTE: 0 K/UL (ref 0–0.5)
FERRITIN: 14 NG/ML (ref 10–291)
GLOBULIN: 2.5 G/DL (ref 2–4)
GLOMERULAR FILTRATION RATE: >60 ML/MIN/1.73 SQ.M.
GLUCOSE: 163 MG/DL (ref 70–99)
HCT VFR BLD CALC: 43.9 % (ref 35.1–46.5)
HEMOGLOBIN: 13.7 G/DL (ref 11.7–15.5)
IRON: 49 MCG/DL (ref 30–160)
LYMPHOCYTES # BLD: 48 % (ref 20–45)
LYMPHOCYTES ABSOLUTE: 2.3 K/UL (ref 1–4.8)
MCH RBC QN AUTO: 27 PG (ref 26–34)
MCHC RBC AUTO-ENTMCNC: 31 G/DL (ref 31–36)
MCV RBC AUTO: 85 FL (ref 80–99)
MONOCYTES ABSOLUTE: 0.3 K/UL (ref 0.1–1)
MONOCYTES: 7 % (ref 3–12)
NEUTROPHILS ABSOLUTE: 2.2 K/UL (ref 1.8–7.7)
NEUTROPHILS: 44 % (ref 40–75)
PDW BLD-RTO: 14.5 % (ref 10–15.5)
PLATELET # BLD: 285 K/UL (ref 140–440)
PMV BLD AUTO: 12.6 FL (ref 9–13)
POTASSIUM SERPL-SCNC: 4.8 MMOL/L (ref 3.5–5.5)
RBC: 5.14 M/UL (ref 3.8–5.2)
SODIUM BLD-SCNC: 137 MMOL/L (ref 133–145)
THIAMINE BLOOD: 91 NMOL/L (ref 78–185)
TOTAL PROTEIN: 6.8 G/DL (ref 6.4–8.3)
VITAMIN B-12: 152 PG/ML (ref 211–911)
VITAMIN D 25-HYDROXY: 26.8 NG/ML (ref 32–100)
WBC: 4.9 K/UL (ref 4–11)

## 2023-10-02 ASSESSMENT — ENCOUNTER SYMPTOMS
ABDOMINAL DISTENTION: 0
SHORTNESS OF BREATH: 0
COUGH: 0
WHEEZING: 0
EYES NEGATIVE: 1

## 2025-03-05 ENCOUNTER — TELEPHONE (OUTPATIENT)
Age: 45
End: 2025-03-05

## 2025-03-05 DIAGNOSIS — K91.2 POSTOPERATIVE MALABSORPTION: Primary | ICD-10-CM

## 2025-03-11 LAB
A/G RATIO: 1.4 RATIO (ref 1.1–2.6)
ALBUMIN: 4.5 G/DL (ref 3.5–5)
ALP BLD-CCNC: 80 U/L (ref 25–115)
ALT SERPL-CCNC: 20 U/L (ref 5–40)
ANION GAP SERPL CALCULATED.3IONS-SCNC: 14 MMOL/L (ref 3–15)
AST SERPL-CCNC: 19 U/L (ref 10–37)
BASOPHILS # BLD: 1 % (ref 0–2)
BASOPHILS ABSOLUTE: 0 K/UL (ref 0–0.2)
BILIRUB SERPL-MCNC: 0.1 MG/DL (ref 0.2–1.2)
BUN BLDV-MCNC: 12 MG/DL (ref 6–22)
CALCIUM SERPL-MCNC: 10 MG/DL (ref 8.4–10.5)
CHLORIDE BLD-SCNC: 101 MMOL/L (ref 98–110)
CHOLESTEROL, TOTAL: 204 MG/DL (ref 110–200)
CHOLESTEROL/HDL RATIO: 2 (ref 0–5)
CO2: 19 MMOL/L (ref 20–32)
CREAT SERPL-MCNC: 0.7 MG/DL (ref 0.5–1.2)
EOSINOPHIL # BLD: 1 % (ref 0–6)
EOSINOPHILS ABSOLUTE: 0 K/UL (ref 0–0.5)
FERRITIN: 12 NG/ML (ref 10–291)
FOLATE: 11.6 NG/ML
GFR, ESTIMATED: >60 ML/MIN/1.73 SQ.M.
GLOBULIN: 3.2 G/DL (ref 2–4)
GLUCOSE: 90 MG/DL (ref 70–99)
HCT VFR BLD CALC: 46 % (ref 35.1–48)
HDLC SERPL-MCNC: 103 MG/DL
HEMOGLOBIN: 14.7 G/DL (ref 11.7–16)
IRON: 26 MCG/DL (ref 30–160)
LDL CHOLESTEROL: 81 MG/DL (ref 50–99)
LDL/HDL RATIO: 0.8
LYMPHOCYTES # BLD: 51 % (ref 20–45)
LYMPHOCYTES ABSOLUTE: 4 K/UL (ref 1–4.8)
MCH RBC QN AUTO: 26 PG (ref 26–34)
MCHC RBC AUTO-ENTMCNC: 32 G/DL (ref 31–36)
MCV RBC AUTO: 83 FL (ref 80–99)
MONOCYTES ABSOLUTE: 0.6 K/UL (ref 0.1–1)
MONOCYTES: 8 % (ref 3–12)
NEUTROPHILS ABSOLUTE: 3.2 K/UL (ref 1.8–7.7)
NEUTROPHILS SEGMENTED: 41 % (ref 40–75)
NON-HDL CHOLESTEROL: 101 MG/DL
PDW BLD-RTO: 15.4 % (ref 10–15.5)
PLATELET # BLD: 316 K/UL (ref 140–440)
PMV BLD AUTO: 12.7 FL (ref 9–13)
POTASSIUM SERPL-SCNC: 4.9 MMOL/L (ref 3.5–5.5)
RBC # BLD: 5.57 M/UL (ref 3.8–5.2)
SODIUM BLD-SCNC: 134 MMOL/L (ref 133–145)
TOTAL PROTEIN: 7.7 G/DL (ref 6.4–8.3)
TRIGL SERPL-MCNC: 98 MG/DL (ref 40–149)
VITAMIN B-12: 507 PG/ML (ref 211–911)
VITAMIN D 25-HYDROXY: 35.4 NG/ML (ref 32–100)
VLDLC SERPL CALC-MCNC: 20 MG/DL (ref 8–30)
WBC # BLD: 7.9 K/UL (ref 4–11)

## 2025-03-17 LAB — THIAMINE BLOOD: 180 NMOL/L (ref 78–185)

## (undated) DEVICE — SHEARS ENDOSCP L36CM DIA5MM ULTRASONIC CRV TIP W/ ADV

## (undated) DEVICE — SCISSORS ENDOSCP DIA5MM CRV MPLR CAUT W/ RATCH HNDL

## (undated) DEVICE — TROCAR LAP L100MM DIA5MM BLDELSS W/ STBL SL ENDOPATH XCEL

## (undated) DEVICE — TRUE CONTENT TO BE POPULATED AS PART OF REBRANDING: Brand: ARGYLE

## (undated) DEVICE — PACK PROCEDURE SURG LAPAROSCOPY 17X7 MM BRTRC PRIMUS

## (undated) DEVICE — STAPLER SKIN L440MM 32MM LNG 12 FIRING B FRM PWR + GRIPPING

## (undated) DEVICE — STAPLE INT WHT BLU G GRN BLK REINF FOR ENDOPATH ECHELON FLX

## (undated) DEVICE — AIRLIFE™ ADULT CUSHION NASAL CANNULA 14 FOOT (4.3) CRUSH-RESISTANT OXYGEN TUBING, AND U/CONNECT-IT ADAPTER: Brand: AIRLIFE™

## (undated) DEVICE — RELOAD STPL L60MM H1-2.6MM MESENTERY THN TISS WHT 6 ROW

## (undated) DEVICE — TROCAR ENDOSCP SHFT L100MM DIA12MM INTEGR STBL ENDOPATH

## (undated) DEVICE — INTENDED FOR TISSUE SEPARATION, AND OTHER PROCEDURES THAT REQUIRE A SHARP SURGICAL BLADE TO PUNCTURE OR CUT.: Brand: BARD-PARKER SAFETY BLADES SIZE 15, STERILE

## (undated) DEVICE — SLEEVE TRCR L100MM DIA5MM UNIV STBL FOR BLDELSS DIL TIP

## (undated) DEVICE — FLEX ADVANTAGE 3000CC: Brand: FLEX ADVANTAGE

## (undated) DEVICE — SET SUCT IRR TIP DISP STRYKEFLOW2

## (undated) DEVICE — SOFT SILICONE HYDROCELLULAR SACRUM DRESSING WITH LOCK AWAY LAYER: Brand: ALLEVYN LIFE SACRUM (LARGE) PACK OF 10

## (undated) DEVICE — BLANKET WRM AD W50XL85.8IN PACU FULL BODY FORC AIR

## (undated) DEVICE — SOL IRR SOD CL 0.9% 1000ML BTL --

## (undated) DEVICE — VISUALIZATION SYSTEM: Brand: CLEARIFY

## (undated) DEVICE — GLOVE SURG SZ 7.5 L11.73IN FNGR THK7.5MIL STRW LTX POLYMER

## (undated) DEVICE — SUTURE VCRL SZ 3-0 L27IN ABSRB VLT L26MM SH 1/2 CIR J316H

## (undated) DEVICE — APPLICATOR BNDG 1MM ADH PREMIERPRO EXOFIN

## (undated) DEVICE — TROCAR ENDOSCP L100MM DIA12MM STBL SL BLDELSS ENDOPATH XCEL

## (undated) DEVICE — RELOAD STPL L60MM H1.5-3.6MM REG TISS BLU GRIPPING SURF B

## (undated) DEVICE — SUTURE VCRL SZ 2-0 L54IN ABSRB VLT W/O NDL POLYGLACTIN 910 J618H

## (undated) DEVICE — TRAY,URINE METER,100% SILICONE,16FR10ML: Brand: MEDLINE

## (undated) DEVICE — SUTURE MCRYL SZ 4-0 L27IN ABSRB UD L24MM PS-1 3/8 CIR PRIM Y935H

## (undated) DEVICE — GARMENT,MEDLINE,DVT,INT,CALF,MED, GEN2: Brand: MEDLINE

## (undated) DEVICE — SUT SLK 2-0SH 30IN BLK --

## (undated) DEVICE — REM POLYHESIVE ADULT PATIENT RETURN ELECTRODE: Brand: VALLEYLAB